# Patient Record
Sex: FEMALE | Race: WHITE | NOT HISPANIC OR LATINO
[De-identification: names, ages, dates, MRNs, and addresses within clinical notes are randomized per-mention and may not be internally consistent; named-entity substitution may affect disease eponyms.]

---

## 2017-11-18 ENCOUNTER — MESSAGE (OUTPATIENT)
Age: 47
End: 2017-11-18

## 2017-11-18 ENCOUNTER — LABORATORY RESULT (OUTPATIENT)
Age: 47
End: 2017-11-18

## 2017-11-18 ENCOUNTER — NON-APPOINTMENT (OUTPATIENT)
Age: 47
End: 2017-11-18

## 2017-11-18 ENCOUNTER — APPOINTMENT (OUTPATIENT)
Dept: INTERNAL MEDICINE | Facility: CLINIC | Age: 47
End: 2017-11-18
Payer: COMMERCIAL

## 2017-11-18 VITALS
HEART RATE: 76 BPM | RESPIRATION RATE: 16 BRPM | BODY MASS INDEX: 21.33 KG/M2 | TEMPERATURE: 98.3 F | DIASTOLIC BLOOD PRESSURE: 70 MMHG | WEIGHT: 128 LBS | HEIGHT: 65 IN | OXYGEN SATURATION: 98 % | SYSTOLIC BLOOD PRESSURE: 106 MMHG

## 2017-11-18 DIAGNOSIS — Z78.9 OTHER SPECIFIED HEALTH STATUS: ICD-10-CM

## 2017-11-18 DIAGNOSIS — Z83.3 FAMILY HISTORY OF DIABETES MELLITUS: ICD-10-CM

## 2017-11-18 DIAGNOSIS — Z82.0 FAMILY HISTORY OF EPILEPSY AND OTHER DISEASES OF THE NERVOUS SYSTEM: ICD-10-CM

## 2017-11-18 PROCEDURE — 99386 PREV VISIT NEW AGE 40-64: CPT

## 2017-11-18 RX ORDER — TOBRAMYCIN 3 MG/ML
0.3 SOLUTION/ DROPS OPHTHALMIC
Qty: 5 | Refills: 0 | Status: COMPLETED | COMMUNITY
Start: 2017-06-18

## 2017-11-24 ENCOUNTER — OTHER (OUTPATIENT)
Age: 47
End: 2017-11-24

## 2017-11-24 DIAGNOSIS — J45.909 UNSPECIFIED ASTHMA, UNCOMPLICATED: ICD-10-CM

## 2017-11-24 RX ORDER — LEVALBUTEROL TARTRATE 45 UG/1
45 AEROSOL, METERED ORAL
Qty: 1 | Refills: 5 | Status: DISCONTINUED | COMMUNITY
Start: 2017-11-18 | End: 2017-11-24

## 2017-11-28 LAB
25(OH)D3 SERPL-MCNC: 14.3 NG/ML
ALBUMIN SERPL ELPH-MCNC: 4.8 G/DL
ALP BLD-CCNC: 61 U/L
ALT SERPL-CCNC: 15 U/L
ANION GAP SERPL CALC-SCNC: 14 MMOL/L
APPEARANCE: ABNORMAL
AST SERPL-CCNC: 15 U/L
BASOPHILS # BLD AUTO: 0.04 K/UL
BASOPHILS NFR BLD AUTO: 0.7 %
BILIRUB SERPL-MCNC: 0.4 MG/DL
BILIRUBIN URINE: NEGATIVE
BLOOD URINE: ABNORMAL
BUN SERPL-MCNC: 14 MG/DL
CALCIUM SERPL-MCNC: 10.2 MG/DL
CHLORIDE SERPL-SCNC: 100 MMOL/L
CHOLEST SERPL-MCNC: 226 MG/DL
CHOLEST/HDLC SERPL: 3.1 RATIO
CO2 SERPL-SCNC: 26 MMOL/L
COLOR: YELLOW
CREAT SERPL-MCNC: 0.77 MG/DL
EOSINOPHIL # BLD AUTO: 0.14 K/UL
EOSINOPHIL NFR BLD AUTO: 2.6 %
GLUCOSE QUALITATIVE U: NEGATIVE MG/DL
GLUCOSE SERPL-MCNC: 87 MG/DL
HCT VFR BLD CALC: 39.4 %
HDLC SERPL-MCNC: 73 MG/DL
HGB BLD-MCNC: 12 G/DL
IMM GRANULOCYTES NFR BLD AUTO: 0 %
IRON SATN MFR SERPL: 8 %
IRON SERPL-MCNC: 31 UG/DL
KETONES URINE: NEGATIVE
LDLC SERPL CALC-MCNC: 135 MG/DL
LEUKOCYTE ESTERASE URINE: NEGATIVE
LYMPHOCYTES # BLD AUTO: 1.82 K/UL
LYMPHOCYTES NFR BLD AUTO: 33.6 %
MAN DIFF?: NORMAL
MCHC RBC-ENTMCNC: 24.9 PG
MCHC RBC-ENTMCNC: 30.5 GM/DL
MCV RBC AUTO: 81.9 FL
MONOCYTES # BLD AUTO: 0.31 K/UL
MONOCYTES NFR BLD AUTO: 5.7 %
NEUTROPHILS # BLD AUTO: 3.1 K/UL
NEUTROPHILS NFR BLD AUTO: 57.4 %
NITRITE URINE: NEGATIVE
PH URINE: 6.5
PLATELET # BLD AUTO: 342 K/UL
POTASSIUM SERPL-SCNC: 4.8 MMOL/L
PROT SERPL-MCNC: 8.1 G/DL
PROTEIN URINE: NEGATIVE MG/DL
RBC # BLD: 4.81 M/UL
RBC # FLD: 16.6 %
SODIUM SERPL-SCNC: 140 MMOL/L
SPECIFIC GRAVITY URINE: 1.02
T4 FREE SERPL-MCNC: 1.4 NG/DL
THYROGLOB AB SERPL-ACNC: <20 IU/ML
THYROPEROXIDASE AB SERPL IA-ACNC: <10 IU/ML
TIBC SERPL-MCNC: 404 UG/DL
TRIGL SERPL-MCNC: 89 MG/DL
TSH SERPL-ACNC: 0.41 UIU/ML
UIBC SERPL-MCNC: 373 UG/DL
UROBILINOGEN URINE: 1 MG/DL
VIT B12 SERPL-MCNC: 410 PG/ML
WBC # FLD AUTO: 5.41 K/UL

## 2018-06-14 ENCOUNTER — APPOINTMENT (OUTPATIENT)
Dept: INTERNAL MEDICINE | Facility: CLINIC | Age: 48
End: 2018-06-14
Payer: COMMERCIAL

## 2018-06-14 ENCOUNTER — NON-APPOINTMENT (OUTPATIENT)
Age: 48
End: 2018-06-14

## 2018-06-14 VITALS
SYSTOLIC BLOOD PRESSURE: 108 MMHG | OXYGEN SATURATION: 97 % | TEMPERATURE: 98.9 F | HEIGHT: 65 IN | BODY MASS INDEX: 20.66 KG/M2 | RESPIRATION RATE: 16 BRPM | DIASTOLIC BLOOD PRESSURE: 67 MMHG | WEIGHT: 124 LBS | HEART RATE: 77 BPM

## 2018-06-14 PROCEDURE — 99213 OFFICE O/P EST LOW 20 MIN: CPT

## 2018-06-14 RX ORDER — OSELTAMIVIR PHOSPHATE 75 MG/1
75 CAPSULE ORAL
Qty: 10 | Refills: 0 | Status: COMPLETED | COMMUNITY
Start: 2018-02-13

## 2018-06-26 ENCOUNTER — APPOINTMENT (OUTPATIENT)
Dept: ORTHOPEDIC SURGERY | Facility: CLINIC | Age: 48
End: 2018-06-26
Payer: COMMERCIAL

## 2018-06-26 VITALS
HEART RATE: 80 BPM | WEIGHT: 124 LBS | SYSTOLIC BLOOD PRESSURE: 109 MMHG | DIASTOLIC BLOOD PRESSURE: 73 MMHG | BODY MASS INDEX: 20.66 KG/M2 | HEIGHT: 65 IN

## 2018-06-26 DIAGNOSIS — M25.512 PAIN IN LEFT SHOULDER: ICD-10-CM

## 2018-06-26 DIAGNOSIS — Z82.62 FAMILY HISTORY OF OSTEOPOROSIS: ICD-10-CM

## 2018-06-26 DIAGNOSIS — Z78.9 OTHER SPECIFIED HEALTH STATUS: ICD-10-CM

## 2018-06-26 PROCEDURE — 99214 OFFICE O/P EST MOD 30 MIN: CPT | Mod: 25

## 2018-06-26 PROCEDURE — 20610 DRAIN/INJ JOINT/BURSA W/O US: CPT | Mod: LT

## 2018-08-12 DIAGNOSIS — F43.0 ACUTE STRESS REACTION: ICD-10-CM

## 2018-09-19 PROBLEM — F43.0 ACUTE REACTION TO SITUATIONAL STRESS: Status: ACTIVE | Noted: 2018-06-14

## 2018-11-30 ENCOUNTER — INPATIENT (INPATIENT)
Facility: HOSPITAL | Age: 48
LOS: 6 days | Discharge: HOME | End: 2018-12-07
Attending: NEUROLOGICAL SURGERY | Admitting: NEUROLOGICAL SURGERY
Payer: COMMERCIAL

## 2018-11-30 VITALS
OXYGEN SATURATION: 100 % | TEMPERATURE: 98 F | DIASTOLIC BLOOD PRESSURE: 47 MMHG | HEART RATE: 78 BPM | SYSTOLIC BLOOD PRESSURE: 113 MMHG | RESPIRATION RATE: 16 BRPM

## 2018-11-30 DIAGNOSIS — R90.0 INTRACRANIAL SPACE-OCCUPYING LESION FOUND ON DIAGNOSTIC IMAGING OF CENTRAL NERVOUS SYSTEM: ICD-10-CM

## 2018-11-30 LAB
APTT BLD: 28.4 SEC — SIGNIFICANT CHANGE UP (ref 27.5–36.3)
BASOPHILS # BLD AUTO: 0.03 K/UL — SIGNIFICANT CHANGE UP (ref 0–0.2)
BASOPHILS NFR BLD AUTO: 0.4 % — SIGNIFICANT CHANGE UP (ref 0–2)
BLD GP AB SCN SERPL QL: NEGATIVE — SIGNIFICANT CHANGE UP
EOSINOPHIL # BLD AUTO: 0.1 K/UL — SIGNIFICANT CHANGE UP (ref 0–0.5)
EOSINOPHIL NFR BLD AUTO: 1.5 % — SIGNIFICANT CHANGE UP (ref 0–6)
HCG SERPL-ACNC: < 5 MIU/ML — SIGNIFICANT CHANGE UP
HCT VFR BLD CALC: 35.5 % — SIGNIFICANT CHANGE UP (ref 34.5–45)
HGB BLD-MCNC: 10.6 G/DL — LOW (ref 11.5–15.5)
IMM GRANULOCYTES # BLD AUTO: 0.03 # — SIGNIFICANT CHANGE UP
IMM GRANULOCYTES NFR BLD AUTO: 0.4 % — SIGNIFICANT CHANGE UP (ref 0–1.5)
INR BLD: 0.92 — SIGNIFICANT CHANGE UP (ref 0.88–1.17)
LYMPHOCYTES # BLD AUTO: 1.71 K/UL — SIGNIFICANT CHANGE UP (ref 1–3.3)
LYMPHOCYTES # BLD AUTO: 24.9 % — SIGNIFICANT CHANGE UP (ref 13–44)
MCHC RBC-ENTMCNC: 23.7 PG — LOW (ref 27–34)
MCHC RBC-ENTMCNC: 29.9 % — LOW (ref 32–36)
MCV RBC AUTO: 79.4 FL — LOW (ref 80–100)
MONOCYTES # BLD AUTO: 0.56 K/UL — SIGNIFICANT CHANGE UP (ref 0–0.9)
MONOCYTES NFR BLD AUTO: 8.1 % — SIGNIFICANT CHANGE UP (ref 2–14)
NEUTROPHILS # BLD AUTO: 4.45 K/UL — SIGNIFICANT CHANGE UP (ref 1.8–7.4)
NEUTROPHILS NFR BLD AUTO: 64.7 % — SIGNIFICANT CHANGE UP (ref 43–77)
NRBC # FLD: 0 — SIGNIFICANT CHANGE UP
PLATELET # BLD AUTO: 273 K/UL — SIGNIFICANT CHANGE UP (ref 150–400)
PMV BLD: 10.2 FL — SIGNIFICANT CHANGE UP (ref 7–13)
PROTHROM AB SERPL-ACNC: 10.5 SEC — SIGNIFICANT CHANGE UP (ref 9.8–13.1)
RBC # BLD: 4.47 M/UL — SIGNIFICANT CHANGE UP (ref 3.8–5.2)
RBC # FLD: 16.3 % — HIGH (ref 10.3–14.5)
RH IG SCN BLD-IMP: POSITIVE — SIGNIFICANT CHANGE UP
WBC # BLD: 6.88 K/UL — SIGNIFICANT CHANGE UP (ref 3.8–10.5)
WBC # FLD AUTO: 6.88 K/UL — SIGNIFICANT CHANGE UP (ref 3.8–10.5)

## 2018-11-30 RX ORDER — INFLUENZA VIRUS VACCINE 15; 15; 15; 15 UG/.5ML; UG/.5ML; UG/.5ML; UG/.5ML
0.5 SUSPENSION INTRAMUSCULAR ONCE
Qty: 0 | Refills: 0 | Status: COMPLETED | OUTPATIENT
Start: 2018-11-30 | End: 2018-12-07

## 2018-11-30 RX ORDER — DEXAMETHASONE 0.5 MG/5ML
6 ELIXIR ORAL EVERY 6 HOURS
Qty: 0 | Refills: 0 | Status: DISCONTINUED | OUTPATIENT
Start: 2018-11-30 | End: 2018-12-03

## 2018-11-30 RX ORDER — ESCITALOPRAM OXALATE 10 MG/1
10 TABLET, FILM COATED ORAL AT BEDTIME
Qty: 0 | Refills: 0 | Status: DISCONTINUED | OUTPATIENT
Start: 2018-11-30 | End: 2018-12-03

## 2018-11-30 RX ORDER — DEXAMETHASONE 0.5 MG/5ML
4 ELIXIR ORAL ONCE
Qty: 0 | Refills: 0 | Status: COMPLETED | OUTPATIENT
Start: 2018-11-30 | End: 2018-11-30

## 2018-11-30 RX ORDER — ACETAMINOPHEN 500 MG
650 TABLET ORAL EVERY 6 HOURS
Qty: 0 | Refills: 0 | Status: DISCONTINUED | OUTPATIENT
Start: 2018-11-30 | End: 2018-12-03

## 2018-11-30 RX ORDER — ALPRAZOLAM 0.25 MG
0.5 TABLET ORAL ONCE
Qty: 0 | Refills: 0 | Status: DISCONTINUED | OUTPATIENT
Start: 2018-11-30 | End: 2018-12-01

## 2018-11-30 RX ORDER — LEVETIRACETAM 250 MG/1
1000 TABLET, FILM COATED ORAL ONCE
Qty: 0 | Refills: 0 | Status: DISCONTINUED | OUTPATIENT
Start: 2018-11-30 | End: 2018-11-30

## 2018-11-30 RX ORDER — LEVETIRACETAM 250 MG/1
500 TABLET, FILM COATED ORAL
Qty: 0 | Refills: 0 | Status: DISCONTINUED | OUTPATIENT
Start: 2018-11-30 | End: 2018-12-03

## 2018-11-30 RX ORDER — INSULIN LISPRO 100/ML
VIAL (ML) SUBCUTANEOUS
Qty: 0 | Refills: 0 | Status: DISCONTINUED | OUTPATIENT
Start: 2018-11-30 | End: 2018-12-03

## 2018-11-30 RX ORDER — PANTOPRAZOLE SODIUM 20 MG/1
40 TABLET, DELAYED RELEASE ORAL
Qty: 0 | Refills: 0 | Status: DISCONTINUED | OUTPATIENT
Start: 2018-11-30 | End: 2018-12-03

## 2018-11-30 RX ORDER — LEVETIRACETAM 250 MG/1
1000 TABLET, FILM COATED ORAL ONCE
Qty: 0 | Refills: 0 | Status: COMPLETED | OUTPATIENT
Start: 2018-11-30 | End: 2018-11-30

## 2018-11-30 RX ADMIN — Medication 4 MILLIGRAM(S): at 11:23

## 2018-11-30 RX ADMIN — Medication 6 MILLIGRAM(S): at 17:57

## 2018-11-30 RX ADMIN — ESCITALOPRAM OXALATE 10 MILLIGRAM(S): 10 TABLET, FILM COATED ORAL at 22:02

## 2018-11-30 RX ADMIN — LEVETIRACETAM 1000 MILLIGRAM(S): 250 TABLET, FILM COATED ORAL at 13:34

## 2018-11-30 RX ADMIN — Medication 650 MILLIGRAM(S): at 18:57

## 2018-11-30 RX ADMIN — Medication 650 MILLIGRAM(S): at 17:57

## 2018-11-30 RX ADMIN — LEVETIRACETAM 500 MILLIGRAM(S): 250 TABLET, FILM COATED ORAL at 17:57

## 2018-11-30 RX ADMIN — LEVETIRACETAM 400 MILLIGRAM(S): 250 TABLET, FILM COATED ORAL at 11:23

## 2018-11-30 NOTE — ED ADULT NURSE NOTE - OBJECTIVE STATEMENT
pt received to room 10 pt is A&0x3 pt comes to ED for HA x 2 months with vision changes. pt went to get a MRI. MRI reveled right pterional meningioma with mass effect and herniation and was told to come to the ER. pt complains of HA 6/10 pt offered pain meds pt denies pain meds at this time. pt VSS 20 g placed in L AC labs were drawn and sent EDMD at bedside for eval meds ordered and given as per EMAR. awaiting further orders Will continue to monitor the pt

## 2018-11-30 NOTE — H&P ADULT - ASSESSMENT
48 year old Female with 2 months of progressive blurry vision an double vision noted to have papilledema on exam referred for MRI brain which showed large 6cm right sided meningioma causing mass effect, cerebral edema and midline shift

## 2018-11-30 NOTE — ED PROVIDER NOTE - NEURO NEGATIVE STATEMENT, MLM
no loss of consciousness, no gait abnormality, + headache, no sensory deficits, and no weakness, +mass

## 2018-11-30 NOTE — H&P ADULT - NSHPLABSRESULTS_GEN_ALL_CORE
MRI (DIsk to be uploaded to PACS) 11/30 -6cm Dural based extraaxial mass arising from Right Pterion causing edema in frontal lobe. Midline shift. Tonsillar herniation.

## 2018-11-30 NOTE — H&P ADULT - NSHPSOCIALHISTORY_GEN_ALL_CORE
Has one 14 year old son, 2nd son  in September from complications from Leukemia  + ETOH socially  No smoking  No ellicit drug use

## 2018-11-30 NOTE — H&P ADULT - HISTORY OF PRESENT ILLNESS
49 yo F denies PMH sent to ER by Neurologist Dr. Yvrose Dias who obtained MRI brain and orbits after patient was evaluated by Opthalmology and Neuroopthalmology and noted to have papilledema. Patient has been suffering from mild headaches since September but attributed this to stress after recently losing her son from complications for bone marrow transplant from Leukemia. Visual changes began at the same time. Patient states her vision was blurry and intermittently seeing diplopia. Denies numbness, tingling, weakness, seizure like activity, Chest pain, SOB. Patient has an MRI brain in 2009 which was reported as normal.       PMD Dr. Bri Valadez

## 2018-11-30 NOTE — H&P ADULT - ATTENDING COMMENTS
giant tumor right ftp cw possible meningioma admit emergently herrnaiation on scan papilledema rb and pc of emergency craniotomy expl and aaccept I b n l t infection bleeding anesth pt neuro deficit seizures and death. will begin steroids anticonvuls and prepare for or with mra ct and cta

## 2018-11-30 NOTE — H&P ADULT - NSHPPHYSICALEXAM_GEN_ALL_CORE
Awake Alert Oriented x 3  Pupils 3 mm  b/l reactive to light  No nystagmus, No gaze palsy  ?Field cut in R outer quadrants  RUE 5/5  LUE 5/5  RLE5/5  LLE 5/5  Sensory intact to light touch

## 2018-11-30 NOTE — ED PROVIDER NOTE - CARE PLAN
Principal Discharge DX:	Meningioma Principal Discharge DX:	Intracranial mass  Secondary Diagnosis:	Visual field loss

## 2018-11-30 NOTE — ED PROVIDER NOTE - ATTENDING CONTRIBUTION TO CARE
Pema: 47 yo female with no significant medical history sent in for neurosurgical evaluation for a newly found brain mass on MRI. PT endorses two months of progressively worsening headaches- typically worse in the morning when she first wakes up and peripheral vision loss. Pt was seen by opthalmology, told that she elevated ocular pressures and was sent for an MRI. Pt had MRI done this morning and was called and sent ot the ED. MRI findings significant for 6cm meningioma causing herniation.  Pt currently c/o headache. Exam: GENERAL: well appearing, NAD, HEENT: MMM, PERRLA, CARDIO: +S1/S2, no murmurs, rubs or gallops, LUNGS: CTA B/L, no wheezing, rales or rhonchi, ABD: soft, nontender, BSx4 quadrants, no guarding or rigidity. EXT: No LE edema or calf TTP, 2+ distal pulses x 4 extremities. 5/5 strength x 4 extremities NEURO: AxOx3, + loss of inferior visual fields, otherwise cranial nerves intact no dysmetria or disdiadochokinesia. Negative Romberg's. SKIN: no rashes or lesions, well perfused A/P- 47 yo female with new meningioma cause visual field deficits and herniation on outpatient mRI. Neurosurgery consulted, recommending 4mg Iv decadron and keppra. will obtain preop labs and reassess.

## 2018-11-30 NOTE — ED PROVIDER NOTE - PHYSICAL EXAMINATION
NEURO: EOMi, PERRLA, visual fields intact, tongue midline, negative romberg, strength 5/5 UE and LE bilaterally, normal gait, facial expressions intact, point to point intact, rapid alternating movements intact, sensate intact to UE and LE bilaterally. NVI NEURO: EOMi, PERRLA, visual fields inferior not intact , tongue midline, negative romberg, strength 5/5 UE and LE bilaterally, normal gait, facial expressions intact, point to point intact, rapid alternating movements intact, sensate intact to UE and LE bilaterally. NVI

## 2018-11-30 NOTE — H&P ADULT - PROBLEM SELECTOR PLAN 1
1. Admit to neurosurgery  2. Neuro checks q 4 hours  3. Keppra 500mg BID for seizure prophylaxis  4. Dexamethasone 4mg q 6 hours (Will add GI prophylaxis)  5. Will need New MRI for operative planning for resection on Monday  6. Medical clearance/Preop labs ordred

## 2018-11-30 NOTE — ED ADULT TRIAGE NOTE - CHIEF COMPLAINT QUOTE
Pt sent to ED from outside MRI, pt has been having headaches for at least a month.  Rec'd call from neurosurg PA that pt has large meningioma with midline shift and signs of early herniation.  Pt denies PMH

## 2018-11-30 NOTE — ED PROVIDER NOTE - MEDICAL DECISION MAKING DETAILS
47 yo F here for known meningioma dx outpatient this morning via MRI. pt reports feeling well currently and is asymptomatic. referred to see Dr. Lul Etienne for Nsurg. PLAN: labs, pre ops, Nsurg cx

## 2018-11-30 NOTE — ED PROVIDER NOTE - PROGRESS NOTE DETAILS
ENEIDA Mendes: d/w nsurg and will see in ED, recc decadron and keppra ENEIDA Mendes: d/w nsurg and will see in ED, recc decadron 4mg and keppra ENEIDA akhtar: seen by Armando, admit to [facility] plan for OR

## 2018-12-01 DIAGNOSIS — D32.9 BENIGN NEOPLASM OF MENINGES, UNSPECIFIED: ICD-10-CM

## 2018-12-01 LAB
BUN SERPL-MCNC: 11 MG/DL — SIGNIFICANT CHANGE UP (ref 7–23)
CALCIUM SERPL-MCNC: 9.5 MG/DL — SIGNIFICANT CHANGE UP (ref 8.4–10.5)
CHLORIDE SERPL-SCNC: 99 MMOL/L — SIGNIFICANT CHANGE UP (ref 98–107)
CO2 SERPL-SCNC: 23 MMOL/L — SIGNIFICANT CHANGE UP (ref 22–31)
CREAT SERPL-MCNC: 0.57 MG/DL — SIGNIFICANT CHANGE UP (ref 0.5–1.3)
GLUCOSE BLDC GLUCOMTR-MCNC: 121 MG/DL — HIGH (ref 70–99)
GLUCOSE BLDC GLUCOMTR-MCNC: 125 MG/DL — HIGH (ref 70–99)
GLUCOSE BLDC GLUCOMTR-MCNC: 150 MG/DL — HIGH (ref 70–99)
GLUCOSE SERPL-MCNC: 123 MG/DL — HIGH (ref 70–99)
HBA1C BLD-MCNC: 5.3 % — SIGNIFICANT CHANGE UP (ref 4–5.6)
HCT VFR BLD CALC: 34.8 % — SIGNIFICANT CHANGE UP (ref 34.5–45)
HGB BLD-MCNC: 10.8 G/DL — LOW (ref 11.5–15.5)
MCHC RBC-ENTMCNC: 23.8 PG — LOW (ref 27–34)
MCHC RBC-ENTMCNC: 31 % — LOW (ref 32–36)
MCV RBC AUTO: 76.7 FL — LOW (ref 80–100)
NRBC # FLD: 0 — SIGNIFICANT CHANGE UP
PLATELET # BLD AUTO: 288 K/UL — SIGNIFICANT CHANGE UP (ref 150–400)
PMV BLD: 11.2 FL — SIGNIFICANT CHANGE UP (ref 7–13)
POTASSIUM SERPL-MCNC: 4.3 MMOL/L — SIGNIFICANT CHANGE UP (ref 3.5–5.3)
POTASSIUM SERPL-SCNC: 4.3 MMOL/L — SIGNIFICANT CHANGE UP (ref 3.5–5.3)
RBC # BLD: 4.54 M/UL — SIGNIFICANT CHANGE UP (ref 3.8–5.2)
RBC # FLD: 16.3 % — HIGH (ref 10.3–14.5)
SODIUM SERPL-SCNC: 135 MMOL/L — SIGNIFICANT CHANGE UP (ref 135–145)
WBC # BLD: 10.17 K/UL — SIGNIFICANT CHANGE UP (ref 3.8–10.5)
WBC # FLD AUTO: 10.17 K/UL — SIGNIFICANT CHANGE UP (ref 3.8–10.5)

## 2018-12-01 PROCEDURE — 70552 MRI BRAIN STEM W/DYE: CPT | Mod: 26

## 2018-12-01 RX ORDER — OXYCODONE HYDROCHLORIDE 5 MG/1
5 TABLET ORAL EVERY 4 HOURS
Qty: 0 | Refills: 0 | Status: DISCONTINUED | OUTPATIENT
Start: 2018-12-01 | End: 2018-12-03

## 2018-12-01 RX ADMIN — Medication 0.5 MILLIGRAM(S): at 02:21

## 2018-12-01 RX ADMIN — PANTOPRAZOLE SODIUM 40 MILLIGRAM(S): 20 TABLET, DELAYED RELEASE ORAL at 07:36

## 2018-12-01 RX ADMIN — Medication 6 MILLIGRAM(S): at 06:58

## 2018-12-01 RX ADMIN — Medication 650 MILLIGRAM(S): at 16:58

## 2018-12-01 RX ADMIN — LEVETIRACETAM 500 MILLIGRAM(S): 250 TABLET, FILM COATED ORAL at 06:58

## 2018-12-01 RX ADMIN — Medication 6 MILLIGRAM(S): at 13:22

## 2018-12-01 RX ADMIN — OXYCODONE HYDROCHLORIDE 5 MILLIGRAM(S): 5 TABLET ORAL at 19:10

## 2018-12-01 RX ADMIN — LEVETIRACETAM 500 MILLIGRAM(S): 250 TABLET, FILM COATED ORAL at 17:57

## 2018-12-01 RX ADMIN — Medication 650 MILLIGRAM(S): at 18:00

## 2018-12-01 RX ADMIN — Medication 6 MILLIGRAM(S): at 00:28

## 2018-12-01 RX ADMIN — Medication 6 MILLIGRAM(S): at 17:57

## 2018-12-01 RX ADMIN — ESCITALOPRAM OXALATE 10 MILLIGRAM(S): 10 TABLET, FILM COATED ORAL at 22:16

## 2018-12-01 RX ADMIN — OXYCODONE HYDROCHLORIDE 5 MILLIGRAM(S): 5 TABLET ORAL at 20:10

## 2018-12-01 NOTE — PROGRESS NOTE ADULT - SUBJECTIVE AND OBJECTIVE BOX
No issues overnight  Vital Signs Last 24 Hrs  T(C): 36.8 (01 Dec 2018 01:51), Max: 37.2 (30 Nov 2018 17:50)  T(F): 98.2 (01 Dec 2018 01:51), Max: 99 (30 Nov 2018 17:50)  HR: 77 (01 Dec 2018 01:51) (77 - 92)  BP: 103/60 (01 Dec 2018 01:51) (100/59 - 122/50)  BP(mean): --  RR: 16 (01 Dec 2018 01:51) (16 - 18)  SpO2: 96% (01 Dec 2018 01:51) (95% - 100%)    AAO x 3  PERRLA, EOMI  DELCID strength 5/5    MEDICATIONS  (STANDING):  dexamethasone     Tablet 6 milliGRAM(s) Oral every 6 hours  escitalopram 10 milliGRAM(s) Oral at bedtime  influenza   Vaccine 0.5 milliLiter(s) IntraMuscular once  insulin lispro (HumaLOG) corrective regimen sliding scale   SubCutaneous three times a day before meals  levETIRAcetam 500 milliGRAM(s) Oral two times a day  pantoprazole    Tablet 40 milliGRAM(s) Oral before breakfast    MEDICATIONS  (PRN):  acetaminophen   Tablet .. 650 milliGRAM(s) Oral every 6 hours PRN Mild Pain (1 - 3)                          10.6   6.88  )-----------( 273      ( 30 Nov 2018 11:17 )             35.5     11-30    139  |  101  |  14  ----------------------------<  90  4.2   |  26  |  0.61    Ca    9.2      30 Nov 2018 10:50    TPro  7.5  /  Alb  4.5  /  TBili  < 0.2<L>  /  DBili  x   /  AST  21  /  ALT  38<H>  /  AlkPhos  67  11-30

## 2018-12-02 ENCOUNTER — TRANSCRIPTION ENCOUNTER (OUTPATIENT)
Age: 48
End: 2018-12-02

## 2018-12-02 DIAGNOSIS — Z01.818 ENCOUNTER FOR OTHER PREPROCEDURAL EXAMINATION: ICD-10-CM

## 2018-12-02 DIAGNOSIS — J45.20 MILD INTERMITTENT ASTHMA, UNCOMPLICATED: ICD-10-CM

## 2018-12-02 LAB
APTT BLD: 23.2 SEC — LOW (ref 27.5–36.3)
BLD GP AB SCN SERPL QL: NEGATIVE — SIGNIFICANT CHANGE UP
BUN SERPL-MCNC: 17 MG/DL — SIGNIFICANT CHANGE UP (ref 7–23)
CALCIUM SERPL-MCNC: 9.4 MG/DL — SIGNIFICANT CHANGE UP (ref 8.4–10.5)
CHLORIDE SERPL-SCNC: 98 MMOL/L — SIGNIFICANT CHANGE UP (ref 98–107)
CO2 SERPL-SCNC: 22 MMOL/L — SIGNIFICANT CHANGE UP (ref 22–31)
CREAT SERPL-MCNC: 0.57 MG/DL — SIGNIFICANT CHANGE UP (ref 0.5–1.3)
GLUCOSE BLDC GLUCOMTR-MCNC: 117 MG/DL — HIGH (ref 70–99)
GLUCOSE BLDC GLUCOMTR-MCNC: 118 MG/DL — HIGH (ref 70–99)
GLUCOSE BLDC GLUCOMTR-MCNC: 124 MG/DL — HIGH (ref 70–99)
GLUCOSE SERPL-MCNC: 146 MG/DL — HIGH (ref 70–99)
HCG SERPL-ACNC: < 5 MIU/ML — SIGNIFICANT CHANGE UP
HCT VFR BLD CALC: 34.7 % — SIGNIFICANT CHANGE UP (ref 34.5–45)
HGB BLD-MCNC: 10.8 G/DL — LOW (ref 11.5–15.5)
INR BLD: 0.95 — SIGNIFICANT CHANGE UP (ref 0.88–1.17)
MCHC RBC-ENTMCNC: 23.6 PG — LOW (ref 27–34)
MCHC RBC-ENTMCNC: 31.1 % — LOW (ref 32–36)
MCV RBC AUTO: 75.8 FL — LOW (ref 80–100)
NRBC # FLD: 0 — SIGNIFICANT CHANGE UP
PLATELET # BLD AUTO: 284 K/UL — SIGNIFICANT CHANGE UP (ref 150–400)
PMV BLD: 10.9 FL — SIGNIFICANT CHANGE UP (ref 7–13)
POTASSIUM SERPL-MCNC: 4.2 MMOL/L — SIGNIFICANT CHANGE UP (ref 3.5–5.3)
POTASSIUM SERPL-SCNC: 4.2 MMOL/L — SIGNIFICANT CHANGE UP (ref 3.5–5.3)
PROTHROM AB SERPL-ACNC: 10.8 SEC — SIGNIFICANT CHANGE UP (ref 9.8–13.1)
RBC # BLD: 4.58 M/UL — SIGNIFICANT CHANGE UP (ref 3.8–5.2)
RBC # FLD: 16.5 % — HIGH (ref 10.3–14.5)
RH IG SCN BLD-IMP: POSITIVE — SIGNIFICANT CHANGE UP
SODIUM SERPL-SCNC: 137 MMOL/L — SIGNIFICANT CHANGE UP (ref 135–145)
T4 FREE SERPL-MCNC: 1.32 NG/DL — SIGNIFICANT CHANGE UP (ref 0.9–1.8)
TSH SERPL-MCNC: < 0.1 UIU/ML — LOW (ref 0.27–4.2)
WBC # BLD: 15.04 K/UL — HIGH (ref 3.8–10.5)
WBC # FLD AUTO: 15.04 K/UL — HIGH (ref 3.8–10.5)

## 2018-12-02 PROCEDURE — 77011 CT SCAN FOR LOCALIZATION: CPT | Mod: 26

## 2018-12-02 PROCEDURE — 99223 1ST HOSP IP/OBS HIGH 75: CPT

## 2018-12-02 PROCEDURE — 70496 CT ANGIOGRAPHY HEAD: CPT | Mod: 26

## 2018-12-02 RX ADMIN — Medication 6 MILLIGRAM(S): at 12:57

## 2018-12-02 RX ADMIN — PANTOPRAZOLE SODIUM 40 MILLIGRAM(S): 20 TABLET, DELAYED RELEASE ORAL at 06:09

## 2018-12-02 RX ADMIN — LEVETIRACETAM 500 MILLIGRAM(S): 250 TABLET, FILM COATED ORAL at 17:35

## 2018-12-02 RX ADMIN — Medication 6 MILLIGRAM(S): at 17:35

## 2018-12-02 RX ADMIN — ESCITALOPRAM OXALATE 10 MILLIGRAM(S): 10 TABLET, FILM COATED ORAL at 22:14

## 2018-12-02 RX ADMIN — OXYCODONE HYDROCHLORIDE 5 MILLIGRAM(S): 5 TABLET ORAL at 22:13

## 2018-12-02 RX ADMIN — Medication 6 MILLIGRAM(S): at 23:44

## 2018-12-02 RX ADMIN — Medication 6 MILLIGRAM(S): at 06:08

## 2018-12-02 RX ADMIN — OXYCODONE HYDROCHLORIDE 5 MILLIGRAM(S): 5 TABLET ORAL at 22:43

## 2018-12-02 RX ADMIN — LEVETIRACETAM 500 MILLIGRAM(S): 250 TABLET, FILM COATED ORAL at 06:08

## 2018-12-02 RX ADMIN — Medication 6 MILLIGRAM(S): at 00:11

## 2018-12-02 NOTE — PROGRESS NOTE ADULT - SUBJECTIVE AND OBJECTIVE BOX
No issues overnight  Vital Signs Last 24 Hrs  T(C): 36.8 (01 Dec 2018 21:17), Max: 37.1 (01 Dec 2018 14:52)  T(F): 98.3 (01 Dec 2018 21:17), Max: 98.8 (01 Dec 2018 14:52)  HR: 61 (01 Dec 2018 21:17) (61 - 78)  BP: 103/57 (01 Dec 2018 21:17) (100/50 - 108/62)  BP(mean): --  RR: 17 (01 Dec 2018 21:17) (16 - 18)  SpO2: 94% (01 Dec 2018 21:17) (94% - 100%)     AAO x 3  PERRLA, EOMI  DELCID strength 5/5    MEDICATIONS  (STANDING):  dexamethasone     Tablet 6 milliGRAM(s) Oral every 6 hours  escitalopram 10 milliGRAM(s) Oral at bedtime  influenza   Vaccine 0.5 milliLiter(s) IntraMuscular once  insulin lispro (HumaLOG) corrective regimen sliding scale   SubCutaneous three times a day before meals  levETIRAcetam 500 milliGRAM(s) Oral two times a day  pantoprazole    Tablet 40 milliGRAM(s) Oral before breakfast    MEDICATIONS  (PRN):  acetaminophen   Tablet .. 650 milliGRAM(s) Oral every 6 hours PRN Mild Pain (1 - 3)  oxyCODONE    IR 5 milliGRAM(s) Oral every 4 hours PRN Moderate Pain (4 - 6)    < from: MR Brain Stereotactic w/ IV Cont (12.01.18 @ 05:16) >  INTERPRETATION:  History: Meningioma. Preop MRI.    MRI of the brain was performed using sagittal T1, axial T 1 fast and echo   T2 weighted sequence of FLAIR diffusion and susceptibility weighted   sequence. The patient was injected with approximately 7 cc of Gadavist IV   with 0.5 cc of conscious discarded. Sagittal coronal and axial   T1-weighted sequences were performed. The patient was injected with   approximately 7 cc of Gadavist IV with 0.5 cc of contrast discarded.   Sagittal coronal and axial T1-weighted sequences were performed.    This exam is compared with prior contrast-enhanced orbital and brain MRI   performed on November 30, 2018.    Large homogeneous enhancing extra-axial lesion is again seen involving   the right frontal region. Associated dural tails are identified. This   lesion measures approximately 6.2 x 4.6 x 4.9 cm and is compatible with   underlying meningioma. Surrounding edema is identified. Extensive mass   effect on the right lateral ventricle seen with subfalcine and uncal   herniation identified. There is evidence of right to left shift (1.4 cm).   There is no evidence acute hemorrhage in posterior fossa or   supratentorial region.    Herniation of the cerebellar tonsils are identified. The tonsils extend   approximately 1.2 cm below the foramen.    Evaluation of the diffusion weighted sequence demonstrates no abnormal   areas of restricted diffusion to suggest acute infarct.    Evaluation of both distal internal carotid, anterior cerebral, left   middle cerebral, basilar and posterior cerebral arteries appear normal.   There is superior displacement of the right M1 and M2 branches secondary   to patient's known meningioma.    Impression: Large right frontal meningioma. Subfalcine and uncal   herniation is seen as well as right to left shift.    MRA demonstrates superior displacement of the right M1 and M2 segments by   the large meningiomain this area.    < end of copied text >

## 2018-12-02 NOTE — CONSULT NOTE ADULT - PROBLEM SELECTOR RECOMMENDATION 9
The patient does not have any shortness of breath with exertion, able to climb up multiple flight of stairs. She denies any chest pain, fevers, chills, nausea or vomiting. The patient also denies history of heart disease, DM, or HTN.    Patient is medically optimized for resection of meningioma. Given her reported history of a thyroid nodules that had spontaneously resolved and had caused hyperthyroidism, would suggest to obtain tsh and free t4.    Medicine will continue to follow

## 2018-12-02 NOTE — CONSULT NOTE ADULT - SUBJECTIVE AND OBJECTIVE BOX
HPI:  49 yo F denies PMH sent to ER by Neurologist Dr. Yvrose Dias who obtained MRI brain and orbits after patient was evaluated by Opthalmology and Neuroopthalmology and noted to have papilledema. Patient has been suffering from mild headaches since September but attributed this to stress after recently losing her son from complications for bone marrow transplant from Leukemia. Visual changes began at the same time. Patient states her vision was blurry and intermittently seeing diplopia. Denies numbness, tingling, weakness, seizure like activity, Chest pain, SOB. Patient has an MRI brain in  which was reported as normal.     PMD Dr. Bri Valadez (2018 12:50)    Patient to undergo neurosurgical resection of meningioma, medical Pre-Op clearance requested. As of note past medical history in this patient includes thyroid nodules around 9 years ago that spontaneously resolved and had caused hyperthyroidism and exercise induced asthma. The patient states that prior to her son BMT she used to run 5-10K and needed to use her rescue inhaler prior to the run, especially when it was cold. Currently, she does not use any rescue inhaler or medication. The patient does not have any shortness of breath with exertion, able to climb up multiple flight of stairs. She denies any chest pain, fevers, chills, nausea or vomiting. The patient also denies history of heart disease, DM, or HTN.       PAST MEDICAL & SURGICAL HISTORY:   delivery delivered      Review of Systems:   CONSTITUTIONAL: No fever, weight loss, or fatigue  EYES: +vision changes, increase in her prescription eye glasses  ENMT:  +Headaches and vertigo  NECK: No pain or stiffness  RESPIRATORY: No cough, wheezing, chills or hemoptysis; No shortness of breath  CARDIOVASCULAR: No chest pain, palpitations, dizziness, or leg swelling  GASTROINTESTINAL: No abdominal or epigastric pain. No nausea, vomiting, or hematemesis; No diarrhea or constipation. No melena or hematochezia.  GENITOURINARY: No dysuria, frequency, hematuria, or incontinence  SKIN: No itching, burning, rashes, or lesions   LYMPH NODES: No enlarged glands  MUSCULOSKELETAL: No joint pain or swelling; No muscle, back, or extremity pain  PSYCHIATRIC: No depression, anxiety, mood swings, or difficulty sleeping  HEME/LYMPH: No easy bruising, or bleeding gums  ALLERGY AND IMMUNOLOGIC: No hives or eczema    Allergies    amoxicillin (Hives)  NSAIDs (Hives)    Intolerances        Social History:   Denies alcohol abuse, states that she usually has two cups of wine a week. Denies drugs or smoking    FAMILY HISTORY:      MEDICATIONS  (STANDING):  dexamethasone     Tablet 6 milliGRAM(s) Oral every 6 hours  escitalopram 10 milliGRAM(s) Oral at bedtime  influenza   Vaccine 0.5 milliLiter(s) IntraMuscular once  insulin lispro (HumaLOG) corrective regimen sliding scale   SubCutaneous three times a day before meals  levETIRAcetam 500 milliGRAM(s) Oral two times a day  pantoprazole    Tablet 40 milliGRAM(s) Oral before breakfast    MEDICATIONS  (PRN):  acetaminophen   Tablet .. 650 milliGRAM(s) Oral every 6 hours PRN Mild Pain (1 - 3)  oxyCODONE    IR 5 milliGRAM(s) Oral every 4 hours PRN Moderate Pain (4 - 6)      T(C): 36.9 (18 @ 17:17), Max: 37.1 (18 @ 06:05)  HR: 67 (18 @ 17:17) (61 - 84)  BP: 103/48 (18 @ 17:17) (102/54 - 103/59)  RR: 18 (18 @ 17:17) (17 - 18)  SpO2: 100% (18 @ 17:17) (94% - 100%)    CAPILLARY BLOOD GLUCOSE      POCT Blood Glucose.: 118 mg/dL (02 Dec 2018 17:05)  POCT Blood Glucose.: 124 mg/dL (02 Dec 2018 12:55)  POCT Blood Glucose.: 117 mg/dL (02 Dec 2018 07:53)    I&O's Summary    01 Dec 2018 07:01  -  02 Dec 2018 07:00  --------------------------------------------------------  IN: 0 mL / OUT: 900 mL / NET: -900 mL    02 Dec 2018 07:01  -  02 Dec 2018 17:45  --------------------------------------------------------  IN: 0 mL / OUT: 0 mL / NET: 0 mL        PHYSICAL EXAM:  GENERAL: NAD, well-developed, very pleasant  HEAD:  Atraumatic, Normocephalic  EYES: EOMI, PERRLA, conjunctiva and sclera clear  NECK: Supple, No elevated JVD  CHEST/LUNG: Clear to auscultation bilaterally; No wheeze  HEART: Regular rate and rhythm; No murmurs, rubs, or gallops  ABDOMEN: Soft, Nontender, Nondistended; Bowel sounds present  EXTREMITIES:  2+ Peripheral Pulses, No clubbing, cyanosis, or edema  PSYCH: AAOx3  NEUROLOGY: CN II-XII grossly intact, moving all extremities  SKIN: No rashes or lesions    LABS:                        10.8   10.17 )-----------( 288      ( 01 Dec 2018 06:26 )             34.8     12-    135  |  99  |  11  ----------------------------<  123<H>  4.3   |  23  |  0.57    Ca    9.5      01 Dec 2018 06:26                  RADIOLOGY & ADDITIONAL TESTS:    ECG Personally Reviewed -     Imaging Personally Reviewed:    Consultant(s) Notes Reviewed:      Care Discussed with Consultants/Other Providers: Case discussed with neurosurgery HPI:  47 yo F denies PMH sent to ER by Neurologist Dr. Yvrose Dias who obtained MRI brain and orbits after patient was evaluated by Opthalmology and Neuroopthalmology and noted to have papilledema. Patient has been suffering from mild headaches since September but attributed this to stress after recently losing her son from complications for bone marrow transplant from Leukemia. Visual changes began at the same time. Patient states her vision was blurry and intermittently seeing diplopia. Denies numbness, tingling, weakness, seizure like activity, Chest pain, SOB. Patient has an MRI brain in  which was reported as normal.     PMD Dr. Bri Valadez (2018 12:50)    Patient to undergo neurosurgical resection of meningioma, medical Pre-Op clearance requested. As of note past medical history in this patient includes thyroid nodules around 9 years ago that spontaneously resolved and had caused hyperthyroidism and she also mentions that she has exercise induced asthma. The patient states that prior to her son's BMT she used to run 5-10K and needed to use her rescue inhaler prior to the run, especially when it was cold. Currently, she does not use a rescue inhaler and does not have any symptoms related to her asthma. She denies shortness of breath with exertion, she is able to climb up multiple flight of stairs. She denies any chest pain, fevers, chills, nausea or vomiting, there is also no mentioned history of heart disease, DM, or HTN.       PAST MEDICAL & SURGICAL HISTORY:   delivery delivered      Review of Systems:   CONSTITUTIONAL: No fever, weight loss, or fatigue  EYES: +vision changes, increase in her prescription eye glasses  ENMT:  +Headaches and vertigo  NECK: No pain or stiffness  RESPIRATORY: No cough, wheezing, chills or hemoptysis; No shortness of breath  CARDIOVASCULAR: No chest pain, palpitations, dizziness, or leg swelling  GASTROINTESTINAL: No abdominal or epigastric pain. No nausea, vomiting, or hematemesis; No diarrhea or constipation. No melena or hematochezia.  GENITOURINARY: No dysuria, frequency, hematuria, or incontinence  SKIN: No itching, burning, rashes, or lesions   LYMPH NODES: No enlarged glands  MUSCULOSKELETAL: No joint pain or swelling; No muscle, back, or extremity pain  PSYCHIATRIC: No depression, anxiety, mood swings, or difficulty sleeping  HEME/LYMPH: No easy bruising, or bleeding gums  ALLERGY AND IMMUNOLOGIC: No hives or eczema    Allergies    amoxicillin (Hives)  NSAIDs (Hives)    Intolerances        Social History:   Denies alcohol abuse, states that she usually has two cups of wine a week. Denies drugs or smoking    FAMILY HISTORY:      MEDICATIONS  (STANDING):  dexamethasone     Tablet 6 milliGRAM(s) Oral every 6 hours  escitalopram 10 milliGRAM(s) Oral at bedtime  influenza   Vaccine 0.5 milliLiter(s) IntraMuscular once  insulin lispro (HumaLOG) corrective regimen sliding scale   SubCutaneous three times a day before meals  levETIRAcetam 500 milliGRAM(s) Oral two times a day  pantoprazole    Tablet 40 milliGRAM(s) Oral before breakfast    MEDICATIONS  (PRN):  acetaminophen   Tablet .. 650 milliGRAM(s) Oral every 6 hours PRN Mild Pain (1 - 3)  oxyCODONE    IR 5 milliGRAM(s) Oral every 4 hours PRN Moderate Pain (4 - 6)      T(C): 36.9 (18 @ 17:17), Max: 37.1 (18 @ 06:05)  HR: 67 (18 @ 17:17) (61 - 84)  BP: 103/48 (18 @ 17:17) (102/54 - 103/59)  RR: 18 (18 @ 17:17) (17 - 18)  SpO2: 100% (18 @ 17:17) (94% - 100%)    CAPILLARY BLOOD GLUCOSE      POCT Blood Glucose.: 118 mg/dL (02 Dec 2018 17:05)  POCT Blood Glucose.: 124 mg/dL (02 Dec 2018 12:55)  POCT Blood Glucose.: 117 mg/dL (02 Dec 2018 07:53)    I&O's Summary    01 Dec 2018 07:01  -  02 Dec 2018 07:00  --------------------------------------------------------  IN: 0 mL / OUT: 900 mL / NET: -900 mL    02 Dec 2018 07:01  -  02 Dec 2018 17:45  --------------------------------------------------------  IN: 0 mL / OUT: 0 mL / NET: 0 mL        PHYSICAL EXAM:  GENERAL: NAD, well-developed, very pleasant  HEAD:  Atraumatic, Normocephalic  EYES: EOMI, PERRLA, conjunctiva and sclera clear  NECK: Supple, No elevated JVD  CHEST/LUNG: Clear to auscultation bilaterally; No wheeze  HEART: Regular rate and rhythm; No murmurs, rubs, or gallops  ABDOMEN: Soft, Nontender, Nondistended; Bowel sounds present  EXTREMITIES:  2+ Peripheral Pulses, No clubbing, cyanosis, or edema  PSYCH: AAOx3  NEUROLOGY: CN II-XII grossly intact, moving all extremities  SKIN: No rashes or lesions    LABS:                        10.8   10.17 )-----------( 288      ( 01 Dec 2018 06:26 )             34.8     12-    135  |  99  |  11  ----------------------------<  123<H>  4.3   |  23  |  0.57    Ca    9.5      01 Dec 2018 06:26                  RADIOLOGY & ADDITIONAL TESTS:    ECG Personally Reviewed -     Imaging Personally Reviewed:    Consultant(s) Notes Reviewed:      Care Discussed with Consultants/Other Providers: Case discussed with neurosurgery

## 2018-12-02 NOTE — CONSULT NOTE ADULT - PROBLEM SELECTOR RECOMMENDATION 3
hx of exercised induced asthma, not currently taking any medication and asymptomatic. No wheezing on auscultation.

## 2018-12-02 NOTE — CONSULT NOTE ADULT - ASSESSMENT
Gabriella Aguirre is a 48 year old lady with hx of exercise induced asthma, hyperthyroidism (as per patient the hyperthyroidism was result of thyroid nodules were not present on follow up) who was admitted to the neurosurgical team b/o meningioma. Medicine consulted for Pre-Op clearance.

## 2018-12-03 ENCOUNTER — RESULT REVIEW (OUTPATIENT)
Age: 48
End: 2018-12-03

## 2018-12-03 DIAGNOSIS — Z98.890 OTHER SPECIFIED POSTPROCEDURAL STATES: ICD-10-CM

## 2018-12-03 LAB
BASE EXCESS BLDA CALC-SCNC: -1.1 MMOL/L — SIGNIFICANT CHANGE UP
BASE EXCESS BLDA CALC-SCNC: -1.1 MMOL/L — SIGNIFICANT CHANGE UP
BASE EXCESS BLDA CALC-SCNC: -1.4 MMOL/L — SIGNIFICANT CHANGE UP
BUN SERPL-MCNC: 9 MG/DL — SIGNIFICANT CHANGE UP (ref 7–23)
CA-I BLDA-SCNC: 1.12 MMOL/L — LOW (ref 1.15–1.29)
CA-I BLDA-SCNC: 1.16 MMOL/L — SIGNIFICANT CHANGE UP (ref 1.15–1.29)
CA-I BLDA-SCNC: 1.28 MMOL/L — SIGNIFICANT CHANGE UP (ref 1.15–1.29)
CALCIUM SERPL-MCNC: 7.8 MG/DL — LOW (ref 8.4–10.5)
CHLORIDE SERPL-SCNC: 106 MMOL/L — SIGNIFICANT CHANGE UP (ref 98–107)
CO2 SERPL-SCNC: 21 MMOL/L — LOW (ref 22–31)
CREAT SERPL-MCNC: 0.56 MG/DL — SIGNIFICANT CHANGE UP (ref 0.5–1.3)
GLUCOSE BLDA-MCNC: 119 MG/DL — HIGH (ref 70–99)
GLUCOSE BLDA-MCNC: 126 MG/DL — HIGH (ref 70–99)
GLUCOSE BLDA-MCNC: 143 MG/DL — HIGH (ref 70–99)
GLUCOSE BLDC GLUCOMTR-MCNC: 106 MG/DL — HIGH (ref 70–99)
GLUCOSE BLDC GLUCOMTR-MCNC: 132 MG/DL — HIGH (ref 70–99)
GLUCOSE SERPL-MCNC: 144 MG/DL — HIGH (ref 70–99)
HCO3 BLDA-SCNC: 24 MMOL/L — SIGNIFICANT CHANGE UP (ref 22–26)
HCT VFR BLD CALC: 25 % — LOW (ref 34.5–45)
HCT VFR BLDA CALC: 29.4 % — LOW (ref 34.5–46.5)
HCT VFR BLDA CALC: 31.4 % — LOW (ref 34.5–46.5)
HCT VFR BLDA CALC: 33.7 % — LOW (ref 34.5–46.5)
HGB BLD-MCNC: 8.2 G/DL — LOW (ref 11.5–15.5)
HGB BLDA-MCNC: 10.2 G/DL — LOW (ref 11.5–15.5)
HGB BLDA-MCNC: 10.9 G/DL — LOW (ref 11.5–15.5)
HGB BLDA-MCNC: 9.5 G/DL — LOW (ref 11.5–15.5)
MAGNESIUM SERPL-MCNC: 1.4 MG/DL — LOW (ref 1.6–2.6)
MCHC RBC-ENTMCNC: 26.5 PG — LOW (ref 27–34)
MCHC RBC-ENTMCNC: 32.8 % — SIGNIFICANT CHANGE UP (ref 32–36)
MCV RBC AUTO: 80.9 FL — SIGNIFICANT CHANGE UP (ref 80–100)
NRBC # FLD: 0 — SIGNIFICANT CHANGE UP
PCO2 BLDA: 29 MMHG — LOW (ref 32–48)
PCO2 BLDA: 30 MMHG — LOW (ref 32–48)
PCO2 BLDA: 32 MMHG — SIGNIFICANT CHANGE UP (ref 32–48)
PH BLDA: 7.45 PH — SIGNIFICANT CHANGE UP (ref 7.35–7.45)
PH BLDA: 7.48 PH — HIGH (ref 7.35–7.45)
PH BLDA: 7.48 PH — HIGH (ref 7.35–7.45)
PHOSPHATE SERPL-MCNC: 5 MG/DL — HIGH (ref 2.5–4.5)
PLATELET # BLD AUTO: 127 K/UL — LOW (ref 150–400)
PMV BLD: 10.8 FL — SIGNIFICANT CHANGE UP (ref 7–13)
PO2 BLDA: 231 MMHG — HIGH (ref 83–108)
PO2 BLDA: 238 MMHG — HIGH (ref 83–108)
PO2 BLDA: 239 MMHG — HIGH (ref 83–108)
POTASSIUM BLDA-SCNC: 3.4 MMOL/L — SIGNIFICANT CHANGE UP (ref 3.4–4.5)
POTASSIUM BLDA-SCNC: 3.7 MMOL/L — SIGNIFICANT CHANGE UP (ref 3.4–4.5)
POTASSIUM BLDA-SCNC: 4.2 MMOL/L — SIGNIFICANT CHANGE UP (ref 3.4–4.5)
POTASSIUM SERPL-MCNC: 4.4 MMOL/L — SIGNIFICANT CHANGE UP (ref 3.5–5.3)
POTASSIUM SERPL-SCNC: 4.4 MMOL/L — SIGNIFICANT CHANGE UP (ref 3.5–5.3)
RBC # BLD: 3.09 M/UL — LOW (ref 3.8–5.2)
RBC # FLD: 15 % — HIGH (ref 10.3–14.5)
SAO2 % BLDA: 99.6 % — HIGH (ref 95–99)
SAO2 % BLDA: 99.7 % — HIGH (ref 95–99)
SAO2 % BLDA: 99.7 % — HIGH (ref 95–99)
SODIUM BLDA-SCNC: 133 MMOL/L — LOW (ref 136–146)
SODIUM BLDA-SCNC: 135 MMOL/L — LOW (ref 136–146)
SODIUM BLDA-SCNC: 135 MMOL/L — LOW (ref 136–146)
SODIUM SERPL-SCNC: 141 MMOL/L — SIGNIFICANT CHANGE UP (ref 135–145)
WBC # BLD: 21.68 K/UL — HIGH (ref 3.8–10.5)
WBC # FLD AUTO: 21.68 K/UL — HIGH (ref 3.8–10.5)

## 2018-12-03 PROCEDURE — 88331 PATH CONSLTJ SURG 1 BLK 1SPC: CPT | Mod: 26

## 2018-12-03 PROCEDURE — 88341 IMHCHEM/IMCYTCHM EA ADD ANTB: CPT | Mod: 26,59

## 2018-12-03 PROCEDURE — 88334 PATH CONSLTJ SURG CYTO XM EA: CPT | Mod: 26,59

## 2018-12-03 PROCEDURE — 88342 IMHCHEM/IMCYTCHM 1ST ANTB: CPT | Mod: 26,59

## 2018-12-03 PROCEDURE — 88360 TUMOR IMMUNOHISTOCHEM/MANUAL: CPT | Mod: 26

## 2018-12-03 PROCEDURE — 88307 TISSUE EXAM BY PATHOLOGIST: CPT | Mod: 26

## 2018-12-03 RX ORDER — OXYCODONE AND ACETAMINOPHEN 5; 325 MG/1; MG/1
1 TABLET ORAL EVERY 4 HOURS
Qty: 0 | Refills: 0 | Status: DISCONTINUED | OUTPATIENT
Start: 2018-12-03 | End: 2018-12-03

## 2018-12-03 RX ORDER — PANTOPRAZOLE SODIUM 20 MG/1
40 TABLET, DELAYED RELEASE ORAL
Qty: 0 | Refills: 0 | Status: DISCONTINUED | OUTPATIENT
Start: 2018-12-03 | End: 2018-12-07

## 2018-12-03 RX ORDER — OXYCODONE HYDROCHLORIDE 5 MG/1
10 TABLET ORAL EVERY 6 HOURS
Qty: 0 | Refills: 0 | Status: DISCONTINUED | OUTPATIENT
Start: 2018-12-03 | End: 2018-12-06

## 2018-12-03 RX ORDER — DEXTROSE MONOHYDRATE, SODIUM CHLORIDE, AND POTASSIUM CHLORIDE 50; .745; 4.5 G/1000ML; G/1000ML; G/1000ML
1000 INJECTION, SOLUTION INTRAVENOUS
Qty: 0 | Refills: 0 | Status: DISCONTINUED | OUTPATIENT
Start: 2018-12-03 | End: 2018-12-04

## 2018-12-03 RX ORDER — SODIUM CHLORIDE 9 MG/ML
1000 INJECTION, SOLUTION INTRAVENOUS ONCE
Qty: 0 | Refills: 0 | Status: COMPLETED | OUTPATIENT
Start: 2018-12-03 | End: 2018-12-03

## 2018-12-03 RX ORDER — LEVETIRACETAM 250 MG/1
500 TABLET, FILM COATED ORAL
Qty: 0 | Refills: 0 | Status: DISCONTINUED | OUTPATIENT
Start: 2018-12-03 | End: 2018-12-07

## 2018-12-03 RX ORDER — DEXAMETHASONE 0.5 MG/5ML
6 ELIXIR ORAL EVERY 6 HOURS
Qty: 0 | Refills: 0 | Status: DISCONTINUED | OUTPATIENT
Start: 2018-12-03 | End: 2018-12-05

## 2018-12-03 RX ORDER — OXYCODONE AND ACETAMINOPHEN 5; 325 MG/1; MG/1
2 TABLET ORAL EVERY 6 HOURS
Qty: 0 | Refills: 0 | Status: DISCONTINUED | OUTPATIENT
Start: 2018-12-03 | End: 2018-12-03

## 2018-12-03 RX ORDER — ALBUMIN HUMAN 25 %
250 VIAL (ML) INTRAVENOUS
Qty: 0 | Refills: 0 | Status: COMPLETED | OUTPATIENT
Start: 2018-12-03 | End: 2018-12-03

## 2018-12-03 RX ORDER — ACETAMINOPHEN 500 MG
650 TABLET ORAL EVERY 6 HOURS
Qty: 0 | Refills: 0 | Status: DISCONTINUED | OUTPATIENT
Start: 2018-12-03 | End: 2018-12-07

## 2018-12-03 RX ORDER — ONDANSETRON 8 MG/1
4 TABLET, FILM COATED ORAL ONCE
Qty: 0 | Refills: 0 | Status: COMPLETED | OUTPATIENT
Start: 2018-12-03 | End: 2018-12-03

## 2018-12-03 RX ORDER — HYDROMORPHONE HYDROCHLORIDE 2 MG/ML
0.4 INJECTION INTRAMUSCULAR; INTRAVENOUS; SUBCUTANEOUS
Qty: 0 | Refills: 0 | Status: DISCONTINUED | OUTPATIENT
Start: 2018-12-03 | End: 2018-12-03

## 2018-12-03 RX ORDER — FENTANYL CITRATE 50 UG/ML
50 INJECTION INTRAVENOUS
Qty: 0 | Refills: 0 | Status: DISCONTINUED | OUTPATIENT
Start: 2018-12-03 | End: 2018-12-03

## 2018-12-03 RX ORDER — CITALOPRAM 10 MG/1
1 TABLET, FILM COATED ORAL
Qty: 0 | Refills: 0 | COMMUNITY

## 2018-12-03 RX ORDER — OXYCODONE HYDROCHLORIDE 5 MG/1
5 TABLET ORAL EVERY 4 HOURS
Qty: 0 | Refills: 0 | Status: DISCONTINUED | OUTPATIENT
Start: 2018-12-03 | End: 2018-12-07

## 2018-12-03 RX ORDER — ESCITALOPRAM OXALATE 10 MG/1
10 TABLET, FILM COATED ORAL AT BEDTIME
Qty: 0 | Refills: 0 | Status: DISCONTINUED | OUTPATIENT
Start: 2018-12-03 | End: 2018-12-07

## 2018-12-03 RX ORDER — INSULIN LISPRO 100/ML
VIAL (ML) SUBCUTANEOUS
Qty: 0 | Refills: 0 | Status: DISCONTINUED | OUTPATIENT
Start: 2018-12-03 | End: 2018-12-06

## 2018-12-03 RX ADMIN — OXYCODONE HYDROCHLORIDE 5 MILLIGRAM(S): 5 TABLET ORAL at 23:19

## 2018-12-03 RX ADMIN — OXYCODONE HYDROCHLORIDE 5 MILLIGRAM(S): 5 TABLET ORAL at 22:00

## 2018-12-03 RX ADMIN — FENTANYL CITRATE 50 MICROGRAM(S): 50 INJECTION INTRAVENOUS at 19:51

## 2018-12-03 RX ADMIN — LEVETIRACETAM 500 MILLIGRAM(S): 250 TABLET, FILM COATED ORAL at 05:21

## 2018-12-03 RX ADMIN — LEVETIRACETAM 500 MILLIGRAM(S): 250 TABLET, FILM COATED ORAL at 22:00

## 2018-12-03 RX ADMIN — Medication 500 MILLILITER(S): at 20:15

## 2018-12-03 RX ADMIN — FENTANYL CITRATE 50 MICROGRAM(S): 50 INJECTION INTRAVENOUS at 20:15

## 2018-12-03 RX ADMIN — Medication 500 MILLILITER(S): at 19:48

## 2018-12-03 RX ADMIN — Medication 6 MILLIGRAM(S): at 05:21

## 2018-12-03 RX ADMIN — SODIUM CHLORIDE 2000 MILLILITER(S): 9 INJECTION, SOLUTION INTRAVENOUS at 19:42

## 2018-12-03 RX ADMIN — DEXTROSE MONOHYDRATE, SODIUM CHLORIDE, AND POTASSIUM CHLORIDE 75 MILLILITER(S): 50; .745; 4.5 INJECTION, SOLUTION INTRAVENOUS at 20:45

## 2018-12-03 NOTE — PROGRESS NOTE ADULT - PROBLEM SELECTOR PLAN 1
Continue neurologic checks and vital signs Q1H  Repeat Head CT in AM  Postop MRI in 24-48H  continue decadron  continue keppra

## 2018-12-03 NOTE — PROGRESS NOTE ADULT - SUBJECTIVE AND OBJECTIVE BOX
Neurosurgery postop  No complaints  ICU Vital Signs Last 24 Hrs  T(C): 36.3 (03 Dec 2018 21:00), Max: 37 (03 Dec 2018 04:02)  T(F): 97.3 (03 Dec 2018 21:00), Max: 98.6 (03 Dec 2018 04:02)  HR: 74 (03 Dec 2018 21:15) (63 - 92)  BP: 97/44 (03 Dec 2018 21:00) (82/59 - 108/60)  BP(mean): 56 (03 Dec 2018 21:00) (55 - 65)  ABP: 84/64 (03 Dec 2018 21:15) (78/45 - 95/53)  ABP(mean): 73 (03 Dec 2018 21:15) (53 - 91)  RR: 21 (03 Dec 2018 21:15) (12 - 21)  SpO2: 100% (03 Dec 2018 21:15) (99% - 100%)    AAO X 3  PERRLA, EOMI  CN 2-12 grossly intact  DELCID strength 5/5  No drift    Dressing C/D/I    POOL: 25cc    MEDICATIONS  (STANDING):  dexamethasone     Tablet 6 milliGRAM(s) Oral every 6 hours  escitalopram 10 milliGRAM(s) Oral at bedtime  influenza   Vaccine 0.5 milliLiter(s) IntraMuscular once  insulin lispro (HumaLOG) corrective regimen sliding scale   SubCutaneous three times a day before meals  levETIRAcetam 500 milliGRAM(s) Oral two times a day  ondansetron Injectable 4 milliGRAM(s) IV Push once  pantoprazole    Tablet 40 milliGRAM(s) Oral before breakfast  sodium chloride 0.9% with potassium chloride 20 mEq/L 1000 milliLiter(s) (75 mL/Hr) IV Continuous <Continuous>    MEDICATIONS  (PRN):  acetaminophen   Tablet .. 650 milliGRAM(s) Oral every 6 hours PRN Mild Pain (1 - 3)  oxyCODONE    IR 5 milliGRAM(s) Oral every 4 hours PRN Moderate Pain (4 - 6)  oxyCODONE    IR 10 milliGRAM(s) Oral every 6 hours PRN Severe Pain (7 - 10)                          8.2    21.68 )-----------( 127      ( 03 Dec 2018 20:20 )             25.0   12-02    137  |  98  |  17  ----------------------------<  146<H>  4.2   |  22  |  0.57    Ca    9.4      02 Dec 2018 19:11

## 2018-12-03 NOTE — PROGRESS NOTE ADULT - SUBJECTIVE AND OBJECTIVE BOX
No issues overnight  Vital Signs Last 24 Hrs  T(C): 36.6 (02 Dec 2018 22:04), Max: 37.1 (02 Dec 2018 06:05)  T(F): 97.8 (02 Dec 2018 22:04), Max: 98.7 (02 Dec 2018 06:05)  HR: 73 (02 Dec 2018 22:04) (67 - 84)  BP: 102/53 (02 Dec 2018 22:04) (102/53 - 103/59)  BP(mean): --  RR: 18 (02 Dec 2018 22:04) (18 - 18)  SpO2: 100% (02 Dec 2018 22:04) (99% - 100%)    AAO x 3  PERRLA, EOMI  DELCID strength 5/5    MEDICATIONS  (STANDING):  dexamethasone     Tablet 6 milliGRAM(s) Oral every 6 hours  escitalopram 10 milliGRAM(s) Oral at bedtime  influenza   Vaccine 0.5 milliLiter(s) IntraMuscular once  insulin lispro (HumaLOG) corrective regimen sliding scale   SubCutaneous three times a day before meals  levETIRAcetam 500 milliGRAM(s) Oral two times a day  pantoprazole    Tablet 40 milliGRAM(s) Oral before breakfast    MEDICATIONS  (PRN):  acetaminophen   Tablet .. 650 milliGRAM(s) Oral every 6 hours PRN Mild Pain (1 - 3)  oxyCODONE    IR 5 milliGRAM(s) Oral every 4 hours PRN Moderate Pain (4 - 6)                          10.8   15.04 )-----------( 284      ( 02 Dec 2018 19:11 )             34.7   12-02    137  |  98  |  17  ----------------------------<  146<H>  4.2   |  22  |  0.57    Ca    9.4      02 Dec 2018 19:11    PT/INR - ( 02 Dec 2018 19:11 )   PT: 10.8 SEC;   INR: 0.95          PTT - ( 02 Dec 2018 19:11 )  PTT:23.2 SEC    HCG Quantitative, Serum (12.02.18 @ 19:11)    HCG Quantitative, Serum: < 5: For pregnancy evaluation the reference values are as  follows:    Negative: <5 mIU/mL  Indeterminate: 5-25 mIU/mL (suggest repeat testing in 72hrs)  Positive: >25 mIU/mL    Note: hCG results of false positive and false negative for  pregnancy are rare, but can occur with this, and other hCG  tests. Purnima- and post-menopausal females may have mildly  elevated hCG concentrations, usually less than 14 mIU/mL,  that are constant over time.    Weeks of pregnancy:           mIU/mL  ------------------        -------  3                     6 -      71  4                    10 -     750  5                   220 -   7,100  6                   160 -  32,000  7                 3,700 - 164,000  8                32,000 - 150,000  9                64,000- 151,000  10                47,000 - 187,000  12                28,000 - 211,000  14                14,000 -  63,000  15                12,000 -  71,000  16 - 18            8,000 -  58,000 mIU/mL    Type + Screen (12.02.18 @ 17:59)    ABO Interpretation: O    Rh Interpretation: Positive    Antibody Screen: Negative    < from: CT Angio Head w/ IV Cont (12.02.18 @ 12:37) >  There is a large 6.0 x 4.6 cm mass within the right frontal region   abutting the right frontal calvarium and appears extra-axial. The mass   demonstrates diffuse homogenous contrast enhancement. There is local mass   effect with approximately 1.7 cm right to left midline shift. There is   mass effect on the right lateral ventricle. The ventricles are not   dilated.    No evidence of acute intracranial hemorrhage or acute territorial infarct.    The visualized paranasal sinuses demonstrate retention cyst or polyps   within the right mastoid sinus, there is lucency with extension to the   floors of the sinuses, underlying dental disease not excluded..    The mastoid air cells and middle ear cavities are unremarkable.    CT ANGIOGRAPHY BRAIN:  There is no evidence for significant stenosis, major vessel occlusion, or   aneurysm about the [place]. There is displacement of the right   M1 medially, by the right extra-axial presumed meningioma are multiple   prominent vessels noted within the mass which demonstrates increased   vascularity. There is an enlarged and prominent right middle meningeal   artery with asymmetric enlargement of the right foramen spinosum. The   enlarged right middle meningeal arterial vessel is noted with thinning of   the bony calvarial margin extending to the mass, axial series 3 image 87.    There is no evidence for significant stenosis, major vessel occlusion, or   aneurysm involving the vertebrobasilar system.    No enlarged vascular lesions or clusters of abnormal vessels are noted to   suggest an arterial venous malformation within the field-of-view.    Visualized portions of the superficial and deep venous systems are   unremarkable.      IMPRESSION:   Noncontrast and contrast-enhanced CT brain: Large enhancing mass within   the right frontal region compatible with a meningioma. There is local   mass effect and 1.7 cm right to left midline shift. No acute intracranial   hemorrhage or territorial infarct. The mass displaces the right middle   arteries medially which extend along the margins of the mass, there is   also an enlarged right middle meningeal artery which supplies the mass   with thinning of the adjacent bony margins.    CT angiography brain: No major vessel occlusion or proximal stenosis.    Normal anatomic variants as discussed in the body the report.      < end of copied text >

## 2018-12-03 NOTE — BRIEF OPERATIVE NOTE - PROCEDURE
<<-----Click on this checkbox to enter Procedure Craniotomy and excision of neoplasm of brain  12/03/2018    Active  ALECIAITT

## 2018-12-03 NOTE — PROGRESS NOTE ADULT - SUBJECTIVE AND OBJECTIVE BOX
SICU Consultation Note  =====================================================  HPI: 48 y.o. female denies PMH sent to ER by Neurologist Dr. Yvrose Dias who obtained MRI brain and orbits after patient was evaluated by Opthalmology and Neuro-ophthalmology and noted to have papilledema. Patient has been suffering from mild headaches since September but, attributed this to stress after recently losing her son from complications for bone marrow transplant from leukemia. Visual changes began at the same time. Patient states her vision was blurry and intermittently seeing diplopia. Denies numbness, tingling, weakness, seizure like activity, Chest pain, SOB. Patient has an MRI brain in  which was reported as normal.     Surgery Information  OR time:      EBL: 1L   Blood Products: 2u PRBC             PAST MEDICAL & SURGICAL HISTORY:   delivery delivered    Home Meds:   Allergies: amoxicillin (Hives)  NSAIDs (Hives)    Soc:   Advanced Directives: Presumed Full Code     ROS:    General: Non-Contributory  Skin/Breast: Non-Contributory  Ophthalmologic: Non-Contributory  ENMT: Non-Contributory  Respiratory and Thorax: Non-Contributory  Cardiovascular: Non-Contributory  Gastrointestinal: Non-Contributory  Genitourinary: Non-Contributory  Musculoskeletal: Non-Contributory  Neurological: Non-Contributory  Psychiatric: Non-Contributory  Hematology/Lymphatics: Non-Contributory  Endocrine: Non-Contributory  Allergic/Immunologic: Non-Contributory    CURRENT MEDICATIONS:   --------------------------------------------------------------------------------------  Neurologic Medications  acetaminophen   Tablet .. 650 milliGRAM(s) Oral every 6 hours PRN Mild Pain (1 - 3)  escitalopram 10 milliGRAM(s) Oral at bedtime  levETIRAcetam 500 milliGRAM(s) Oral two times a day  oxyCODONE    IR 5 milliGRAM(s) Oral every 4 hours PRN Moderate Pain (4 - 6)  oxyCODONE    IR 10 milliGRAM(s) Oral every 6 hours PRN Severe Pain (7 - 10)    Respiratory Medications    Cardiovascular Medications    Gastrointestinal Medications  pantoprazole    Tablet 40 milliGRAM(s) Oral before breakfast  sodium chloride 0.9% with potassium chloride 20 mEq/L 1000 milliLiter(s) IV Continuous <Continuous>    Genitourinary Medications    Hematologic/Oncologic Medications  influenza   Vaccine 0.5 milliLiter(s) IntraMuscular once    Antimicrobial/Immunologic Medications    Endocrine/Metabolic Medications  dexamethasone     Tablet 6 milliGRAM(s) Oral every 6 hours  insulin lispro (HumaLOG) corrective regimen sliding scale   SubCutaneous three times a day before meals    Topical/Other Medications    --------------------------------------------------------------------------------------    VITAL SIGNS, INS/OUTS (last 24 hours):  --------------------------------------------------------------------------------------  ((Insert SICU Vitals / Is+Os here)) ***  --------------------------------------------------------------------------------------    EXAM:  General/Neuro  RASS:   GCS:   Exam: Normal, NAD, alert, oriented x 3, no focal deficits. PERRLA  ***    Respiratory  Exam: Lungs clear to auscultation, Normal expansion/effort.  ***  [] Tracheostomy   [] Intubated  Mechanical Ventilation:     Cardiovascular  Exam: S1, S2.  Regular rate and rhythm.  Peripheral edema  ***  Cardiac Rhythm: Normal Sinus Rhythm  ECHO:     GI  Exam: Abdomen soft, Non-tender, Non-distended.  Gastrostomy / Jejunostomy tube in place.  Nasogastric tube in place.  Colostomy / Ileostomy.  ***  Wound:   ***  Current Diet:  NPO***      Tubes/Lines/Drains  ***  [x] Peripheral IV  [] Central Venous Line     	[] R	[] L	[] IJ	[] Fem	[] SC        Type:	    Date Placed:   [] Arterial Line		[] R	[] L	[] Fem	[] Rad	[] Ax	Date Placed:   [] PICC:         	[] Midline		[] Mediport           [] Urinary Catheter		Date Placed:     Extremities  Exam: Extremities warm, pink, well-perfused.        Derm:  Exam: Good skin turgor, no skin breakdown.      :   Exam: Toscano catheter in place.     LABS  --------------------------------------------------------------------------------------  ((Insert SICU Labs here))***  --------------------------------------------------------------------------------------    OTHER LABS    IMAGING RESULTS      ASSESSMENT:  48y Female ***    PLAN:   Neurologic:   Respiratory:   Cardiovascular:   Gastrointestinal/Nutrition:   Renal/Genitourinary:   Hematologic:   Infectious Disease:   Lines/Tubes:  Endocrine:   Disposition:     --------------------------------------------------------------------------------------    Critical Care Diagnoses: SICU Consultation Note  =====================================================  HPI: 48 y.o. female denies PMH sent to ER by Neurologist Dr. Yvrose Dias who obtained MRI brain and orbits after patient was evaluated by Opthalmology and Neuro-ophthalmology and noted to have papilledema. Patient has been suffering from mild headaches since September but, attributed this to stress after recently losing her son from complications for bone marrow transplant from leukemia. Visual changes began at the same time. Patient states her vision was blurry and intermittently seeing diplopia. Denies numbness, tingling, weakness, seizure like activity, Chest pain, SOB. Patient has an MRI brain in  which was reported as normal.     Surgery Information  OR time:      EBL: 1L   Blood Products: 2u PRBC             PAST MEDICAL & SURGICAL HISTORY:   delivery delivered    Home Meds:   Allergies: amoxicillin (Hives)  NSAIDs (Hives)    Soc:   Advanced Directives: Presumed Full Code     ROS:    Denies fever, chills, CP, SOB, abd pain, numbness, and tingling    CURRENT MEDICATIONS:   --------------------------------------------------------------------------------------  Neurologic Medications  acetaminophen   Tablet .. 650 milliGRAM(s) Oral every 6 hours PRN Mild Pain (1 - 3)  escitalopram 10 milliGRAM(s) Oral at bedtime  levETIRAcetam 500 milliGRAM(s) Oral two times a day  oxyCODONE    IR 5 milliGRAM(s) Oral every 4 hours PRN Moderate Pain (4 - 6)  oxyCODONE    IR 10 milliGRAM(s) Oral every 6 hours PRN Severe Pain (7 - 10)    Respiratory Medications    Cardiovascular Medications    Gastrointestinal Medications  pantoprazole    Tablet 40 milliGRAM(s) Oral before breakfast  sodium chloride 0.9% with potassium chloride 20 mEq/L 1000 milliLiter(s) IV Continuous <Continuous>    Genitourinary Medications    Hematologic/Oncologic Medications  influenza   Vaccine 0.5 milliLiter(s) IntraMuscular once    Antimicrobial/Immunologic Medications    Endocrine/Metabolic Medications  dexamethasone     Tablet 6 milliGRAM(s) Oral every 6 hours  insulin lispro (HumaLOG) corrective regimen sliding scale   SubCutaneous three times a day before meals    Topical/Other Medications    --------------------------------------------------------------------------------------  ICU Vital Signs Last 24 Hrs  T(C): 36.3 (03 Dec 2018 21:00), Max: 37 (03 Dec 2018 04:02)  T(F): 97.3 (03 Dec 2018 21:00), Max: 98.6 (03 Dec 2018 04:02)  HR: 74 (03 Dec 2018 21:15) (63 - 92)  BP: 97/44 (03 Dec 2018 21:00) (82/59 - 108/60)  BP(mean): 56 (03 Dec 2018 21:00) (55 - 65)  ABP: 84/64 (03 Dec 2018 21:15) (78/45 - 95/53)  ABP(mean): 73 (03 Dec 2018 21:15) (53 - 91)  RR: 21 (03 Dec 2018 21:15) (12 - 21)  SpO2: 100% (03 Dec 2018 21:15) (99% - 100%)    --------------------------------------------------------------------------------------  I&O's Detail    02 Dec 2018 07:01  -  03 Dec 2018 07:00  --------------------------------------------------------  IN:  Total IN: 0 mL    OUT:    Voided: 1550 mL  Total OUT: 1550 mL    Total NET: -1550 mL      03 Dec 2018 07:01  -  03 Dec 2018 22:03  --------------------------------------------------------  IN:    IV PiggyBack: 500 mL    Lactated Ringers IV Bolus: 1000 mL    sodium chloride 0.9% with potassium chloride 20 mEq/L: 150 mL  Total IN: 1650 mL    OUT:    Bulb: 75 mL    Indwelling Catheter - Urethral: 265 mL  Total OUT: 340 mL    Total NET: 1310 mL        --------------------------------------------------------------------------------------    EXAM:  General/Neuro  Exam: NAD, alert, oriented x 3, no focal deficits. FARHANA BOLIVAR  AASERGE X 3    Respiratory  Exam: Lungs clear to auscultation, Normal expansion/effort.     Cardiovascular  Exam: S1, S2.  Regular rate and rhythm.  Peripheral edema      GI  Exam: Abdomen soft, Non-tender, Non-distended  Current Diet:  NPO      Tubes/Lines/Drains   [x] Peripheral IV  [x Arterial Line		[x R	[] L	[] Fem	[] Rad	[] Ax	Date Placed: 12/3/18  [x} Urinary Catheter		Date Placed: 12/3/18    Extremities  Exam: Extremities warm, pink, well-perfused.      Derm:  Exam: Good skin turgor, no skin breakdown.      :   Exam: Rao catheter in place.     LABS  --------------------------------------------------------------------------------------  CBC ( @ 20:20)                              8.2<L>                         21.68<H>  )----------------(  127<L>     --    % Neutrophils, --    % Lymphocytes, ANC: --                                  25.0<L>  CBC ( 19:11)                              10.8<L>                         15.04<H>  )----------------(  284        --    % Neutrophils, --    % Lymphocytes, ANC: --                                  34.7      BMP ( @ 20:20)             141     |  106     |  9     		Ca++ --      Ca 7.8<L>             ---------------------------------( 144<H>		Mg 1.4<L>             4.4     |  21<L>   |  0.56  			Ph 5.0<H>  BMP ( 19:11)             137     |  98      |  17    		Ca++ --      Ca 9.4                ---------------------------------( 146<H>		Mg --                 4.2     |  22      |  0.57  			Ph --          Coags ( @ 19:11)  aPTT 23.2<L> / INR 0.95 / PT 10.8      ABG ( @ 16:41)     7.48<H> / 30<L> / 231<H> / 24 / -1.1 / 99.7<H>%     Lactate:    ABG ( @ 14:55)     7.48<H> / 29<L> / 238<H> / 24 / -1.4 / 99.7<H>%     Lactate:        --------------------------------------------------------------------------------------    IMAGING RESULTS  CT Head 18  Large enhancing mass within the   right frontal region compatible with a meningioma. There is local mass   effect and 1.7 cm right to left midline shift. No acute intracranial   hemorrhage or territorial infarct. The mass displaces the right middle   arteries medially which extend along the margins of the mass, there is also   an enlarged right middle meningeal artery which supplies the mass with   thinning of the adjacent bony margins.     CT angiography brain: No major vessel occlusion or proximal stenosis.   Normal anatomic variants as discussed in the body the report.       ASSESSMENT:  48 y.o. female s/p right craniotomy, resection fo meningioma    PLAN:   Pain management  - c/w tylenol, oxycodone PRN    Neurologic:   - c/w lexapro, decadron, keppra  - CT Head AM (noncontrast)    Respiratory:   - f/u blood gas AM  - f/u CXR AM    Cardiovascular:   - c/w IVF ( NS with 20mq K @ 75mL/hr)    Gastrointestinal/Nutrition:   - NPO  - c/w protonix  Renal/Genitourinary:  - rao  - strict Is and Os     Hematologic:  - f/u Am labs     Infectious Disease:   - No active Abx    Lines/Tubes:  - c/w right radial A-line  - monitor vital signs Q1     Endocrine:  - NIALL, FS     Disposition:   -Stable in PACU, admitted to SICU  -------------------------------------------------------------------------------------- SICU Consultation Note  =====================================================  HPI: 48 y.o. female denies PMH sent to ER by Neurologist Dr. Yvrose Dias who obtained MRI brain and orbits after patient was evaluated by Opthalmology and Neuro-ophthalmology and noted to have papilledema. Patient has been suffering from mild headaches since September but, attributed this to stress after recently losing her son from complications for bone marrow transplant from leukemia. Visual changes began at the same time. Patient states her vision was blurry and intermittently seeing diplopia. Denies numbness, tingling, weakness, seizure like activity, Chest pain, SOB. Patient has an MRI brain in  which was reported as normal.     Surgery Information  OR time:      EBL: 1L   Blood Products: 2u PRBC             PAST MEDICAL & SURGICAL HISTORY:   delivery delivered    Home Meds:   Allergies: amoxicillin (Hives)  NSAIDs (Hives)    Soc:   Advanced Directives: Presumed Full Code     ROS:    Denies fever, chills, CP, SOB, abd pain, numbness, and tingling    CURRENT MEDICATIONS:   --------------------------------------------------------------------------------------  Neurologic Medications  acetaminophen   Tablet .. 650 milliGRAM(s) Oral every 6 hours PRN Mild Pain (1 - 3)  escitalopram 10 milliGRAM(s) Oral at bedtime  levETIRAcetam 500 milliGRAM(s) Oral two times a day  oxyCODONE    IR 5 milliGRAM(s) Oral every 4 hours PRN Moderate Pain (4 - 6)  oxyCODONE    IR 10 milliGRAM(s) Oral every 6 hours PRN Severe Pain (7 - 10)    Respiratory Medications    Cardiovascular Medications    Gastrointestinal Medications  pantoprazole    Tablet 40 milliGRAM(s) Oral before breakfast  sodium chloride 0.9% with potassium chloride 20 mEq/L 1000 milliLiter(s) IV Continuous <Continuous>    Genitourinary Medications    Hematologic/Oncologic Medications  influenza   Vaccine 0.5 milliLiter(s) IntraMuscular once    Antimicrobial/Immunologic Medications    Endocrine/Metabolic Medications  dexamethasone     Tablet 6 milliGRAM(s) Oral every 6 hours  insulin lispro (HumaLOG) corrective regimen sliding scale   SubCutaneous three times a day before meals    Topical/Other Medications    --------------------------------------------------------------------------------------  ICU Vital Signs Last 24 Hrs  T(C): 36.3 (03 Dec 2018 21:00), Max: 37 (03 Dec 2018 04:02)  T(F): 97.3 (03 Dec 2018 21:00), Max: 98.6 (03 Dec 2018 04:02)  HR: 74 (03 Dec 2018 21:15) (63 - 92)  BP: 97/44 (03 Dec 2018 21:00) (82/59 - 108/60)  BP(mean): 56 (03 Dec 2018 21:00) (55 - 65)  ABP: 84/64 (03 Dec 2018 21:15) (78/45 - 95/53)  ABP(mean): 73 (03 Dec 2018 21:15) (53 - 91)  RR: 21 (03 Dec 2018 21:15) (12 - 21)  SpO2: 100% (03 Dec 2018 21:15) (99% - 100%)    --------------------------------------------------------------------------------------  I&O's Detail    02 Dec 2018 07:01  -  03 Dec 2018 07:00  --------------------------------------------------------  IN:  Total IN: 0 mL    OUT:    Voided: 1550 mL  Total OUT: 1550 mL    Total NET: -1550 mL      03 Dec 2018 07:01  -  03 Dec 2018 22:03  --------------------------------------------------------  IN:    IV PiggyBack: 500 mL    Lactated Ringers IV Bolus: 1000 mL    sodium chloride 0.9% with potassium chloride 20 mEq/L: 150 mL  Total IN: 1650 mL    OUT:    Bulb: 75 mL    Indwelling Catheter - Urethral: 265 mL  Total OUT: 340 mL    Total NET: 1310 mL        --------------------------------------------------------------------------------------    EXAM:  General/Neuro  Exam: NAD, alert, oriented x 3, no focal deficits. FARHANA BOLIVAR  AASERGE X 3    Respiratory  Exam: Lungs clear to auscultation, Normal expansion/effort.     Cardiovascular  Exam: S1, S2.  Regular rate and rhythm.  Peripheral edema      GI  Exam: Abdomen soft, Non-tender, Non-distended  Current Diet:  NPO      Tubes/Lines/Drains   [x] Peripheral IV  [x Arterial Line		[x R	[] L	[] Fem	[] Rad	[] Ax	Date Placed: 12/3/18  [x} Urinary Catheter		Date Placed: 12/3/18    Extremities  Exam: Extremities warm, pink, well-perfused.      Derm:  Exam: Good skin turgor, no skin breakdown.      :   Exam: Rao catheter in place.     LABS  --------------------------------------------------------------------------------------  CBC ( @ 20:20)                              8.2<L>                         21.68<H>  )----------------(  127<L>     --    % Neutrophils, --    % Lymphocytes, ANC: --                                  25.0<L>  CBC ( 19:11)                              10.8<L>                         15.04<H>  )----------------(  284        --    % Neutrophils, --    % Lymphocytes, ANC: --                                  34.7      BMP ( @ 20:20)             141     |  106     |  9     		Ca++ --      Ca 7.8<L>             ---------------------------------( 144<H>		Mg 1.4<L>             4.4     |  21<L>   |  0.56  			Ph 5.0<H>  BMP ( 19:11)             137     |  98      |  17    		Ca++ --      Ca 9.4                ---------------------------------( 146<H>		Mg --                 4.2     |  22      |  0.57  			Ph --          Coags ( @ 19:11)  aPTT 23.2<L> / INR 0.95 / PT 10.8      ABG ( @ 16:41)     7.48<H> / 30<L> / 231<H> / 24 / -1.1 / 99.7<H>%     Lactate:    ABG ( @ 14:55)     7.48<H> / 29<L> / 238<H> / 24 / -1.4 / 99.7<H>%     Lactate:        --------------------------------------------------------------------------------------    IMAGING RESULTS  CT Head 18  Large enhancing mass within the   right frontal region compatible with a meningioma. There is local mass   effect and 1.7 cm right to left midline shift. No acute intracranial   hemorrhage or territorial infarct. The mass displaces the right middle   arteries medially which extend along the margins of the mass, there is also   an enlarged right middle meningeal artery which supplies the mass with   thinning of the adjacent bony margins.     CT angiography brain: No major vessel occlusion or proximal stenosis.   Normal anatomic variants as discussed in the body the report.       ASSESSMENT:  48 y.o. female s/p right craniotomy, resection for meningioma    PLAN:   Pain management  - c/w tylenol, oxycodone PRN    Neurologic:   - c/w lexapro, decadron, keppra  - CT Head AM (noncontrast)    Respiratory:   - f/u blood gas AM  - f/u CXR AM    Cardiovascular:   - c/w IVF ( NS with 20mq K @ 75mL/hr)    Gastrointestinal/Nutrition:   - NPO  - c/w protonix  Renal/Genitourinary:  - rao  - strict Is and Os     Hematologic:  - f/u Am labs     Infectious Disease:   - No active Abx    Lines/Tubes:  - c/w right radial A-line  - monitor vital signs Q1     Endocrine:  - NIALL, FS     Disposition:   -Stable in PACU, admitted to SICU  --------------------------------------------------------------------------------------

## 2018-12-04 LAB
BASE EXCESS BLDA CALC-SCNC: 3.1 MMOL/L — SIGNIFICANT CHANGE UP
BASOPHILS # BLD AUTO: 0.01 K/UL — SIGNIFICANT CHANGE UP (ref 0–0.2)
BASOPHILS NFR BLD AUTO: 0.1 % — SIGNIFICANT CHANGE UP (ref 0–2)
BUN SERPL-MCNC: 10 MG/DL — SIGNIFICANT CHANGE UP (ref 7–23)
CA-I BLDA-SCNC: 1.15 MMOL/L — SIGNIFICANT CHANGE UP (ref 1.15–1.29)
CALCIUM SERPL-MCNC: 8.4 MG/DL — SIGNIFICANT CHANGE UP (ref 8.4–10.5)
CHLORIDE SERPL-SCNC: 102 MMOL/L — SIGNIFICANT CHANGE UP (ref 98–107)
CO2 SERPL-SCNC: 25 MMOL/L — SIGNIFICANT CHANGE UP (ref 22–31)
CREAT SERPL-MCNC: 0.56 MG/DL — SIGNIFICANT CHANGE UP (ref 0.5–1.3)
EOSINOPHIL # BLD AUTO: 0.01 K/UL — SIGNIFICANT CHANGE UP (ref 0–0.5)
EOSINOPHIL NFR BLD AUTO: 0.1 % — SIGNIFICANT CHANGE UP (ref 0–6)
GLUCOSE BLDA-MCNC: 115 MG/DL — HIGH (ref 70–99)
GLUCOSE BLDC GLUCOMTR-MCNC: 114 MG/DL — HIGH (ref 70–99)
GLUCOSE BLDC GLUCOMTR-MCNC: 123 MG/DL — HIGH (ref 70–99)
GLUCOSE BLDC GLUCOMTR-MCNC: 135 MG/DL — HIGH (ref 70–99)
GLUCOSE SERPL-MCNC: 110 MG/DL — HIGH (ref 70–99)
HCO3 BLDA-SCNC: 27 MMOL/L — HIGH (ref 22–26)
HCT VFR BLD CALC: 23.9 % — LOW (ref 34.5–45)
HCT VFR BLDA CALC: 25.4 % — LOW (ref 34.5–46.5)
HGB BLD-MCNC: 8.2 G/DL — LOW (ref 11.5–15.5)
HGB BLDA-MCNC: 8.2 G/DL — LOW (ref 11.5–15.5)
IMM GRANULOCYTES # BLD AUTO: 0.18 # — SIGNIFICANT CHANGE UP
IMM GRANULOCYTES NFR BLD AUTO: 0.9 % — SIGNIFICANT CHANGE UP (ref 0–1.5)
LACTATE BLDA-SCNC: 1.2 MMOL/L — SIGNIFICANT CHANGE UP (ref 0.5–2)
LYMPHOCYTES # BLD AUTO: 14.3 % — SIGNIFICANT CHANGE UP (ref 13–44)
LYMPHOCYTES # BLD AUTO: 2.72 K/UL — SIGNIFICANT CHANGE UP (ref 1–3.3)
MAGNESIUM SERPL-MCNC: 1.6 MG/DL — SIGNIFICANT CHANGE UP (ref 1.6–2.6)
MCHC RBC-ENTMCNC: 26.9 PG — LOW (ref 27–34)
MCHC RBC-ENTMCNC: 34.3 % — SIGNIFICANT CHANGE UP (ref 32–36)
MCV RBC AUTO: 78.4 FL — LOW (ref 80–100)
MONOCYTES # BLD AUTO: 2.09 K/UL — HIGH (ref 0–0.9)
MONOCYTES NFR BLD AUTO: 11 % — SIGNIFICANT CHANGE UP (ref 2–14)
NEUTROPHILS # BLD AUTO: 13.98 K/UL — HIGH (ref 1.8–7.4)
NEUTROPHILS NFR BLD AUTO: 73.6 % — SIGNIFICANT CHANGE UP (ref 43–77)
NRBC # FLD: 0 — SIGNIFICANT CHANGE UP
PCO2 BLDA: 35 MMHG — SIGNIFICANT CHANGE UP (ref 32–48)
PH BLDA: 7.49 PH — HIGH (ref 7.35–7.45)
PHOSPHATE SERPL-MCNC: 3.9 MG/DL — SIGNIFICANT CHANGE UP (ref 2.5–4.5)
PLATELET # BLD AUTO: 130 K/UL — LOW (ref 150–400)
PMV BLD: 11.4 FL — SIGNIFICANT CHANGE UP (ref 7–13)
PO2 BLDA: 108 MMHG — SIGNIFICANT CHANGE UP (ref 83–108)
POTASSIUM BLDA-SCNC: 4 MMOL/L — SIGNIFICANT CHANGE UP (ref 3.4–4.5)
POTASSIUM SERPL-MCNC: 4.1 MMOL/L — SIGNIFICANT CHANGE UP (ref 3.5–5.3)
POTASSIUM SERPL-SCNC: 4.1 MMOL/L — SIGNIFICANT CHANGE UP (ref 3.5–5.3)
RBC # BLD: 3.05 M/UL — LOW (ref 3.8–5.2)
RBC # FLD: 15.5 % — HIGH (ref 10.3–14.5)
SAO2 % BLDA: 98.7 % — SIGNIFICANT CHANGE UP (ref 95–99)
SODIUM BLDA-SCNC: 135 MMOL/L — LOW (ref 136–146)
SODIUM SERPL-SCNC: 137 MMOL/L — SIGNIFICANT CHANGE UP (ref 135–145)
WBC # BLD: 18.99 K/UL — HIGH (ref 3.8–10.5)
WBC # FLD AUTO: 18.99 K/UL — HIGH (ref 3.8–10.5)

## 2018-12-04 PROCEDURE — 71045 X-RAY EXAM CHEST 1 VIEW: CPT | Mod: 26

## 2018-12-04 PROCEDURE — 70450 CT HEAD/BRAIN W/O DYE: CPT | Mod: 26

## 2018-12-04 RX ORDER — ACETAMINOPHEN 500 MG
1000 TABLET ORAL ONCE
Qty: 0 | Refills: 0 | Status: COMPLETED | OUTPATIENT
Start: 2018-12-04 | End: 2018-12-04

## 2018-12-04 RX ADMIN — Medication 6 MILLIGRAM(S): at 19:31

## 2018-12-04 RX ADMIN — Medication 650 MILLIGRAM(S): at 17:00

## 2018-12-04 RX ADMIN — Medication 100 MILLIGRAM(S): at 22:30

## 2018-12-04 RX ADMIN — Medication 650 MILLIGRAM(S): at 16:15

## 2018-12-04 RX ADMIN — PANTOPRAZOLE SODIUM 40 MILLIGRAM(S): 20 TABLET, DELAYED RELEASE ORAL at 06:50

## 2018-12-04 RX ADMIN — Medication 650 MILLIGRAM(S): at 10:30

## 2018-12-04 RX ADMIN — Medication 650 MILLIGRAM(S): at 10:00

## 2018-12-04 RX ADMIN — Medication 400 MILLIGRAM(S): at 00:54

## 2018-12-04 RX ADMIN — LEVETIRACETAM 500 MILLIGRAM(S): 250 TABLET, FILM COATED ORAL at 06:49

## 2018-12-04 RX ADMIN — OXYCODONE HYDROCHLORIDE 10 MILLIGRAM(S): 5 TABLET ORAL at 22:35

## 2018-12-04 RX ADMIN — ESCITALOPRAM OXALATE 10 MILLIGRAM(S): 10 TABLET, FILM COATED ORAL at 22:30

## 2018-12-04 RX ADMIN — OXYCODONE HYDROCHLORIDE 5 MILLIGRAM(S): 5 TABLET ORAL at 02:46

## 2018-12-04 RX ADMIN — ESCITALOPRAM OXALATE 10 MILLIGRAM(S): 10 TABLET, FILM COATED ORAL at 00:34

## 2018-12-04 RX ADMIN — OXYCODONE HYDROCHLORIDE 5 MILLIGRAM(S): 5 TABLET ORAL at 02:16

## 2018-12-04 RX ADMIN — OXYCODONE HYDROCHLORIDE 10 MILLIGRAM(S): 5 TABLET ORAL at 22:55

## 2018-12-04 RX ADMIN — Medication 1000 MILLIGRAM(S): at 01:28

## 2018-12-04 RX ADMIN — LEVETIRACETAM 500 MILLIGRAM(S): 250 TABLET, FILM COATED ORAL at 19:30

## 2018-12-04 RX ADMIN — OXYCODONE HYDROCHLORIDE 5 MILLIGRAM(S): 5 TABLET ORAL at 07:00

## 2018-12-04 RX ADMIN — OXYCODONE HYDROCHLORIDE 5 MILLIGRAM(S): 5 TABLET ORAL at 08:00

## 2018-12-04 RX ADMIN — Medication 6 MILLIGRAM(S): at 06:49

## 2018-12-04 RX ADMIN — Medication 6 MILLIGRAM(S): at 11:18

## 2018-12-04 RX ADMIN — Medication 6 MILLIGRAM(S): at 00:34

## 2018-12-04 NOTE — PROGRESS NOTE ADULT - SUBJECTIVE AND OBJECTIVE BOX
ANESTHESIA POSTOP CHECK    48y Female POSTOP DAY 1     Patient remains in PACU overnight on SICU service.    No COMPLAINTS    NO APPARENT ANESTHESIA COMPLICATIONS

## 2018-12-04 NOTE — PROGRESS NOTE ADULT - SUBJECTIVE AND OBJECTIVE BOX
SICU Daily Progress Note  =====================================================  48 y.o. female denies PMH sent to ER by Neurologist Dr. Yvrose Dias who obtained MRI brain and orbits after patient was evaluated by Opthalmology and Neuro-ophthalmology and noted to have papilledema. Patient has been suffering from mild headaches since September but, attributed this to stress after recently losing her son from complications for bone marrow transplant from leukemia. Visual changes began at the same time. Patient states her vision was blurry and intermittently seeing diplopia. Denies numbness, tingling, weakness, seizure like activity, Chest pain, SOB. Patient has an MRI brain in 2009 which was reported as normal.     Allergies: amoxicillin (Hives)  NSAIDs (Hives)    MEDICATIONS:   --------------------------------------------------------------------------------------  Neurologic Medications  acetaminophen   Tablet .. 650 milliGRAM(s) Oral every 6 hours PRN Mild Pain (1 - 3)  escitalopram 10 milliGRAM(s) Oral at bedtime  levETIRAcetam 500 milliGRAM(s) Oral two times a day  oxyCODONE    IR 5 milliGRAM(s) Oral every 4 hours PRN Moderate Pain (4 - 6)  oxyCODONE    IR 10 milliGRAM(s) Oral every 6 hours PRN Severe Pain (7 - 10)    Respiratory Medications    Cardiovascular Medications    Gastrointestinal Medications  pantoprazole    Tablet 40 milliGRAM(s) Oral before breakfast  sodium chloride 0.9% with potassium chloride 20 mEq/L 1000 milliLiter(s) IV Continuous <Continuous>    Genitourinary Medications    Hematologic/Oncologic Medications  influenza   Vaccine 0.5 milliLiter(s) IntraMuscular once    Antimicrobial/Immunologic Medications    Endocrine/Metabolic Medications  dexamethasone     Tablet 6 milliGRAM(s) Oral every 6 hours  insulin lispro (HumaLOG) corrective regimen sliding scale   SubCutaneous three times a day before meals    Topical/Other Medications    --------------------------------------------------------------------------------------  ICU Vital Signs Last 24 Hrs  T(C): 36.9 (04 Dec 2018 01:00), Max: 37 (03 Dec 2018 04:02)  T(F): 98.4 (04 Dec 2018 01:00), Max: 98.6 (03 Dec 2018 04:02)  HR: 74 (04 Dec 2018 01:00) (63 - 92)  BP: 106/52 (04 Dec 2018 01:00) (81/49 - 108/60)  BP(mean): 65 (04 Dec 2018 01:00) (55 - 84)  ABP: 99/66 (03 Dec 2018 23:30) (78/45 - 99/66)  ABP(mean): 72 (03 Dec 2018 23:30) (53 - 91)  RR: 12 (04 Dec 2018 01:00) (12 - 21)  SpO2: 97% (04 Dec 2018 01:00) (97% - 100%)    --------------------------------------------------------------------------------------  I&O's Detail    02 Dec 2018 07:01  -  03 Dec 2018 07:00  --------------------------------------------------------  IN:  Total IN: 0 mL    OUT:    Voided: 1550 mL  Total OUT: 1550 mL    Total NET: -1550 mL      03 Dec 2018 07:01  -  04 Dec 2018 01:56  --------------------------------------------------------  IN:    IV PiggyBack: 500 mL    Lactated Ringers IV Bolus: 1000 mL    sodium chloride 0.9% with potassium chloride 20 mEq/L: 375 mL  Total IN: 1875 mL    OUT:    Bulb: 97 mL    Indwelling Catheter - Urethral: 685 mL  Total OUT: 782 mL    Total NET: 1093 mL      --------------------------------------------------------------------------------------    EXAM    General/Neuro  Exam: NAD, alert, oriented x 3, no focal deficits. FARHANA BOLIVAR  AASERGE X 3    Respiratory  Exam: Lungs clear to auscultation, Normal expansion/effort.     Cardiovascular  Exam: S1, S2.  Regular rate and rhythm.  Peripheral edema      GI  Exam: Abdomen soft, Non-tender, Non-distended  Current Diet:  NPO    Tubes/Lines/Drains   [x] Peripheral IV  [x Arterial Line		[x R	[] L	[] Fem	[] Rad	[] Ax	Date Placed: 12/3/18  [x} Urinary Catheter		Date Placed: 12/3/18    Extremities  Exam: Extremities warm, pink, well-perfused.      Derm:  Exam: Good skin turgor, no skin breakdown.      :   Exam: Rao catheter in place.       METABOLIC/FLUIDS/ELECTROLYTES  sodium chloride 0.9% with potassium chloride 20 mEq/L 1000 milliLiter(s) IV Continuous <Continuous>      HEMATOLOGIC  [x] VTE Prophylaxis:   Transfusions:	[] PRBC	[] Platelets		[] FFP	[] Cryoprecipitate    INFECTIOUS DISEASE  Antimicrobials/Immunologic Medications:  influenza   Vaccine 0.5 milliLiter(s) IntraMuscular once      LABS  --------------------------------------------------------------------------------------  CBC (12-03 @ 20:20)                              8.2<L>                         21.68<H>  )----------------(  127<L>     --    % Neutrophils, --    % Lymphocytes, ANC: --                                  25.0<L>  CBC (12-02 @ 19:11)                              10.8<L>                         15.04<H>  )----------------(  284        --    % Neutrophils, --    % Lymphocytes, ANC: --                                  34.7      BMP (12-03 @ 20:20)             141     |  106     |  9     		Ca++ --      Ca 7.8<L>             ---------------------------------( 144<H>		Mg 1.4<L>             4.4     |  21<L>   |  0.56  			Ph 5.0<H>  BMP (12-02 @ 19:11)             137     |  98      |  17    		Ca++ --      Ca 9.4                ---------------------------------( 146<H>		Mg --                 4.2     |  22      |  0.57  			Ph --          Coags (12-02 @ 19:11)  aPTT 23.2<L> / INR 0.95 / PT 10.8      ABG (12-03 @ 16:41)     7.48<H> / 30<L> / 231<H> / 24 / -1.1 / 99.7<H>%     Lactate:    ABG (12-03 @ 14:55)     7.48<H> / 29<L> / 238<H> / 24 / -1.4 / 99.7<H>%     Lactate:        --------------------------------------------------------------------------------------      ASSESSMENT:  48 y.o. female s/p right craniotomy, resection for meningioma    PLAN:   Pain management  - c/w tylenol, oxycodone PRN    Neurologic:   - c/w lexapro, decadron, keppra  - CT Head AM (noncontrast)    Respiratory:   - f/u blood gas AM  - f/u CXR AM    Cardiovascular:   - c/w IVF ( NS with 20mq K @ 75mL/hr)    Gastrointestinal/Nutrition:   - NPO  - c/w protonix  Renal/Genitourinary:  - rao  - strict Is and Os     Hematologic:  - f/u Am labs     Infectious Disease:   - No active Abx    Lines/Tubes:  - c/w right radial A-line  - monitor vital signs Q1     Endocrine:  - NIALL, FS     -------------------------------------------------------------------------------------- SICU Daily Progress Note  =====================================================  48 y.o. female denies PMH sent to ER by Neurologist Dr. Yvrose Dias who obtained MRI brain and orbits after patient was evaluated by Opthalmology and Neuro-ophthalmology and noted to have papilledema. Patient has been suffering from mild headaches since September but, attributed this to stress after recently losing her son from complications for bone marrow transplant from leukemia. Visual changes began at the same time. Patient states her vision was blurry and intermittently seeing diplopia. Denies numbness, tingling, weakness, seizure like activity, Chest pain, SOB. Patient has an MRI brain in 2009 which was reported as normal.     Allergies: amoxicillin (Hives)  NSAIDs (Hives)    MEDICATIONS:   --------------------------------------------------------------------------------------  Neurologic Medications  acetaminophen   Tablet .. 650 milliGRAM(s) Oral every 6 hours PRN Mild Pain (1 - 3)  escitalopram 10 milliGRAM(s) Oral at bedtime  levETIRAcetam 500 milliGRAM(s) Oral two times a day  oxyCODONE    IR 5 milliGRAM(s) Oral every 4 hours PRN Moderate Pain (4 - 6)  oxyCODONE    IR 10 milliGRAM(s) Oral every 6 hours PRN Severe Pain (7 - 10)    Respiratory Medications    Cardiovascular Medications    Gastrointestinal Medications  pantoprazole    Tablet 40 milliGRAM(s) Oral before breakfast  sodium chloride 0.9% with potassium chloride 20 mEq/L 1000 milliLiter(s) IV Continuous <Continuous>    Genitourinary Medications    Hematologic/Oncologic Medications  influenza   Vaccine 0.5 milliLiter(s) IntraMuscular once    Antimicrobial/Immunologic Medications    Endocrine/Metabolic Medications  dexamethasone     Tablet 6 milliGRAM(s) Oral every 6 hours  insulin lispro (HumaLOG) corrective regimen sliding scale   SubCutaneous three times a day before meals    Topical/Other Medications    --------------------------------------------------------------------------------------  ICU Vital Signs Last 24 Hrs  T(C): 36.9 (04 Dec 2018 01:00), Max: 37 (03 Dec 2018 04:02)  T(F): 98.4 (04 Dec 2018 01:00), Max: 98.6 (03 Dec 2018 04:02)  HR: 74 (04 Dec 2018 01:00) (63 - 92)  BP: 106/52 (04 Dec 2018 01:00) (81/49 - 108/60)  BP(mean): 65 (04 Dec 2018 01:00) (55 - 84)  ABP: 99/66 (03 Dec 2018 23:30) (78/45 - 99/66)  ABP(mean): 72 (03 Dec 2018 23:30) (53 - 91)  RR: 12 (04 Dec 2018 01:00) (12 - 21)  SpO2: 97% (04 Dec 2018 01:00) (97% - 100%)    --------------------------------------------------------------------------------------  I&O's Detail    02 Dec 2018 07:01  -  03 Dec 2018 07:00  --------------------------------------------------------  IN:  Total IN: 0 mL    OUT:    Voided: 1550 mL  Total OUT: 1550 mL    Total NET: -1550 mL      03 Dec 2018 07:01  -  04 Dec 2018 01:56  --------------------------------------------------------  IN:    IV PiggyBack: 500 mL    Lactated Ringers IV Bolus: 1000 mL    sodium chloride 0.9% with potassium chloride 20 mEq/L: 375 mL  Total IN: 1875 mL    OUT:    Bulb: 97 mL    Indwelling Catheter - Urethral: 685 mL  Total OUT: 782 mL    Total NET: 1093 mL      --------------------------------------------------------------------------------------    EXAM    General/Neuro  Exam: NAD, alert, oriented x 3, no focal deficits. FARHANA BOLIVAR  AASERGE X 3  R subdural drain in place    Respiratory  Exam: Lungs clear to auscultation, Normal expansion/effort.     Cardiovascular  Exam: S1, S2.  Regular rate and rhythm.  Peripheral edema      GI  Exam: Abdomen soft, Non-tender, Non-distended  Current Diet:  NPO    Tubes/Lines/Drains   [x] Peripheral IV  [ ] Arterial Line		[ ] R	[] L	[] Fem	[] Rad	[] Ax	Date Placed: 12/3/18  [ ] Toscano    Extremities  Exam: Extremities warm, pink, well-perfused.      Derm:  Exam: Good skin turgor, no skin breakdown.      :   Exam: Toscano catheter in place.       METABOLIC/FLUIDS/ELECTROLYTES  sodium chloride 0.9% with potassium chloride 20 mEq/L 1000 milliLiter(s) IV Continuous <Continuous>      HEMATOLOGIC  [x] VTE Prophylaxis:   Transfusions:	[] PRBC	[] Platelets		[] FFP	[] Cryoprecipitate    INFECTIOUS DISEASE  Antimicrobials/Immunologic Medications:  influenza   Vaccine 0.5 milliLiter(s) IntraMuscular once      LABS  --------------------------------------------------------------------------------------  CBC (12-03 @ 20:20)                              8.2<L>                         21.68<H>  )----------------(  127<L>     --    % Neutrophils, --    % Lymphocytes, ANC: --                                  25.0<L>  CBC (12-02 @ 19:11)                              10.8<L>                         15.04<H>  )----------------(  284        --    % Neutrophils, --    % Lymphocytes, ANC: --                                  34.7      BMP (12-03 @ 20:20)             141     |  106     |  9     		Ca++ --      Ca 7.8<L>             ---------------------------------( 144<H>		Mg 1.4<L>             4.4     |  21<L>   |  0.56  			Ph 5.0<H>  BMP (12-02 @ 19:11)             137     |  98      |  17    		Ca++ --      Ca 9.4                ---------------------------------( 146<H>		Mg --                 4.2     |  22      |  0.57  			Ph --          Coags (12-02 @ 19:11)  aPTT 23.2<L> / INR 0.95 / PT 10.8      ABG (12-03 @ 16:41)     7.48<H> / 30<L> / 231<H> / 24 / -1.1 / 99.7<H>%     Lactate:    ABG (12-03 @ 14:55)     7.48<H> / 29<L> / 238<H> / 24 / -1.4 / 99.7<H>%     Lactate:

## 2018-12-04 NOTE — PROGRESS NOTE ADULT - ATTENDING COMMENTS
awake alert sitting in bed following commands. ct satisfactory hct 24.9 observation continue blayne drain

## 2018-12-04 NOTE — PROGRESS NOTE ADULT - ASSESSMENT
ASSESSMENT:  48 y.o. female s/p right craniotomy, resection for meningioma    PLAN:   Pain management  - c/w tylenol, oxycodone PRN    Neurologic:   - c/w lexapro, decadron, keppra  - Pain: tylenol, oxycodone PRN  - cont SD drain, Abx while drain is in  - Q1hr neuro checks  - CT Head this AM -> f/u read  - MRI 24-48 hours postop (to be done tonight, early AM tomorrow)  - cont to appreciate neurosurgery rec's    Respiratory:   - satting well on RA    Cardiovascular:   - HR, BP stable    Gastrointestinal/Nutrition:   - Regular diet started today -> will dc IVF when tolerating  - c/w protonix    Renal/Genitourinary:  - monitor BUN/Cr  - dc Toscano     Hematologic:  - monitor H/H  - hold chemical DVT ppx until at least 24 hours postop, SCDs for now    Infectious Disease:   - Abx while SD drain is in    Lines/Tubes:  - PIV  - Subdural POOL drain  - A-line & Toscano dc'd    Endocrine:  - c/w decadron  - BOUBACAR TIERNEY

## 2018-12-04 NOTE — PROGRESS NOTE ADULT - SUBJECTIVE AND OBJECTIVE BOX
No issues overnight  ICU Vital Signs Last 24 Hrs  T(C): 36.9 (04 Dec 2018 01:00), Max: 37 (03 Dec 2018 10:48)  T(F): 98.4 (04 Dec 2018 01:00), Max: 98.6 (03 Dec 2018 10:48)  HR: 71 (04 Dec 2018 03:00) (63 - 92)  BP: 100/47 (04 Dec 2018 03:00) (81/49 - 108/60)  BP(mean): 60 (04 Dec 2018 03:00) (55 - 84)  ABP: 99/66 (03 Dec 2018 23:30) (78/45 - 99/66)  ABP(mean): 72 (03 Dec 2018 23:30) (53 - 91)  RR: 16 (04 Dec 2018 03:00) (12 - 21)  SpO2: 100% (04 Dec 2018 03:00) (97% - 100%)    AAO X 3  PERRLA, EOMI  CN 2-12 grossly intact  DELCID strength 5/5  No drift    POOL: 97cc    MEDICATIONS  (STANDING):  dexamethasone     Tablet 6 milliGRAM(s) Oral every 6 hours  escitalopram 10 milliGRAM(s) Oral at bedtime  influenza   Vaccine 0.5 milliLiter(s) IntraMuscular once  insulin lispro (HumaLOG) corrective regimen sliding scale   SubCutaneous three times a day before meals  levETIRAcetam 500 milliGRAM(s) Oral two times a day  pantoprazole    Tablet 40 milliGRAM(s) Oral before breakfast  sodium chloride 0.9% with potassium chloride 20 mEq/L 1000 milliLiter(s) (75 mL/Hr) IV Continuous <Continuous>    MEDICATIONS  (PRN):  acetaminophen   Tablet .. 650 milliGRAM(s) Oral every 6 hours PRN Mild Pain (1 - 3)  oxyCODONE    IR 5 milliGRAM(s) Oral every 4 hours PRN Moderate Pain (4 - 6)  oxyCODONE    IR 10 milliGRAM(s) Oral every 6 hours PRN Severe Pain (7 - 10)                          8.2    21.68 )-----------( 127      ( 03 Dec 2018 20:20 )             25.0   12-03    141  |  106  |  9   ----------------------------<  144<H>  4.4   |  21<L>  |  0.56    Ca    7.8<L>      03 Dec 2018 20:20  Phos  5.0     12-03  Mg     1.4     12-03

## 2018-12-05 LAB
BUN SERPL-MCNC: 8 MG/DL — SIGNIFICANT CHANGE UP (ref 7–23)
CALCIUM SERPL-MCNC: 9.1 MG/DL — SIGNIFICANT CHANGE UP (ref 8.4–10.5)
CHLORIDE SERPL-SCNC: 102 MMOL/L — SIGNIFICANT CHANGE UP (ref 98–107)
CO2 SERPL-SCNC: 27 MMOL/L — SIGNIFICANT CHANGE UP (ref 22–31)
CREAT SERPL-MCNC: 0.57 MG/DL — SIGNIFICANT CHANGE UP (ref 0.5–1.3)
GLUCOSE BLDC GLUCOMTR-MCNC: 126 MG/DL — HIGH (ref 70–99)
GLUCOSE BLDC GLUCOMTR-MCNC: 129 MG/DL — HIGH (ref 70–99)
GLUCOSE BLDC GLUCOMTR-MCNC: 136 MG/DL — HIGH (ref 70–99)
GLUCOSE BLDC GLUCOMTR-MCNC: 136 MG/DL — HIGH (ref 70–99)
GLUCOSE BLDC GLUCOMTR-MCNC: 144 MG/DL — HIGH (ref 70–99)
GLUCOSE SERPL-MCNC: 123 MG/DL — HIGH (ref 70–99)
HCT VFR BLD CALC: 25.9 % — LOW (ref 34.5–45)
HGB BLD-MCNC: 8.7 G/DL — LOW (ref 11.5–15.5)
MAGNESIUM SERPL-MCNC: 2 MG/DL — SIGNIFICANT CHANGE UP (ref 1.6–2.6)
MCHC RBC-ENTMCNC: 27.3 PG — SIGNIFICANT CHANGE UP (ref 27–34)
MCHC RBC-ENTMCNC: 33.6 % — SIGNIFICANT CHANGE UP (ref 32–36)
MCV RBC AUTO: 81.2 FL — SIGNIFICANT CHANGE UP (ref 80–100)
NRBC # FLD: 0 — SIGNIFICANT CHANGE UP
PHOSPHATE SERPL-MCNC: 3.7 MG/DL — SIGNIFICANT CHANGE UP (ref 2.5–4.5)
PLATELET # BLD AUTO: 156 K/UL — SIGNIFICANT CHANGE UP (ref 150–400)
PMV BLD: 11.5 FL — SIGNIFICANT CHANGE UP (ref 7–13)
POTASSIUM SERPL-MCNC: 4.1 MMOL/L — SIGNIFICANT CHANGE UP (ref 3.5–5.3)
POTASSIUM SERPL-SCNC: 4.1 MMOL/L — SIGNIFICANT CHANGE UP (ref 3.5–5.3)
RBC # BLD: 3.19 M/UL — LOW (ref 3.8–5.2)
RBC # FLD: 16 % — HIGH (ref 10.3–14.5)
SODIUM SERPL-SCNC: 139 MMOL/L — SIGNIFICANT CHANGE UP (ref 135–145)
WBC # BLD: 15.05 K/UL — HIGH (ref 3.8–10.5)
WBC # FLD AUTO: 15.05 K/UL — HIGH (ref 3.8–10.5)

## 2018-12-05 PROCEDURE — 70450 CT HEAD/BRAIN W/O DYE: CPT | Mod: 26

## 2018-12-05 RX ORDER — HEPARIN SODIUM 5000 [USP'U]/ML
5000 INJECTION INTRAVENOUS; SUBCUTANEOUS EVERY 8 HOURS
Qty: 0 | Refills: 0 | Status: DISCONTINUED | OUTPATIENT
Start: 2018-12-05 | End: 2018-12-05

## 2018-12-05 RX ORDER — DEXAMETHASONE 0.5 MG/5ML
4 ELIXIR ORAL EVERY 6 HOURS
Qty: 0 | Refills: 0 | Status: DISCONTINUED | OUTPATIENT
Start: 2018-12-05 | End: 2018-12-06

## 2018-12-05 RX ADMIN — PANTOPRAZOLE SODIUM 40 MILLIGRAM(S): 20 TABLET, DELAYED RELEASE ORAL at 06:06

## 2018-12-05 RX ADMIN — LEVETIRACETAM 500 MILLIGRAM(S): 250 TABLET, FILM COATED ORAL at 06:06

## 2018-12-05 RX ADMIN — Medication 4 MILLIGRAM(S): at 13:59

## 2018-12-05 RX ADMIN — Medication 6 MILLIGRAM(S): at 08:08

## 2018-12-05 RX ADMIN — Medication 650 MILLIGRAM(S): at 04:10

## 2018-12-05 RX ADMIN — Medication 650 MILLIGRAM(S): at 11:21

## 2018-12-05 RX ADMIN — LEVETIRACETAM 500 MILLIGRAM(S): 250 TABLET, FILM COATED ORAL at 17:34

## 2018-12-05 RX ADMIN — Medication 4 MILLIGRAM(S): at 20:52

## 2018-12-05 RX ADMIN — OXYCODONE HYDROCHLORIDE 10 MILLIGRAM(S): 5 TABLET ORAL at 20:52

## 2018-12-05 RX ADMIN — Medication 650 MILLIGRAM(S): at 11:55

## 2018-12-05 RX ADMIN — ESCITALOPRAM OXALATE 10 MILLIGRAM(S): 10 TABLET, FILM COATED ORAL at 21:02

## 2018-12-05 RX ADMIN — Medication 650 MILLIGRAM(S): at 04:30

## 2018-12-05 RX ADMIN — OXYCODONE HYDROCHLORIDE 10 MILLIGRAM(S): 5 TABLET ORAL at 21:33

## 2018-12-05 RX ADMIN — Medication 6 MILLIGRAM(S): at 02:25

## 2018-12-05 RX ADMIN — Medication 100 MILLIGRAM(S): at 06:00

## 2018-12-05 NOTE — PROGRESS NOTE ADULT - ASSESSMENT
48 y.o. female POD 2 s/p right craniotomy, resection for meningioma, recovering without issue    PLAN:   Pain management  - c/w tylenol, oxycodone PRN    Neurologic:   - c/w lexapro, decadron, keppra  - Pain: tylenol, oxycodone PRN  - cont SD drain, Abx while drain is in  - Q1hr neuro checks  - CT Head this AM 12/5 -> f/u official read  - MRI to be done today   - cont to appreciate neurosurgery rec's    Respiratory:   - satting well on RA    Cardiovascular:   - HR, BP stable    Gastrointestinal/Nutrition:   - Regular diet started today -> will dc IVF when tolerating  - c/w protonix    Renal/Genitourinary:  - monitor BUN/Cr  - dc Toscano     Hematologic:  - monitor H/H  - f/u with Neurosx for initiating chemical DVT ppx,  SCDs for now    Infectious Disease:   - Abx while SD drain is in    Lines/Tubes:  - PIV  - Subdural POOL drain    Endocrine:  - c/w decadron  - BOUBACAR TIERNEY 48 y.o. female POD 2 s/p right craniotomy, resection for meningioma, recovering without issue    PLAN:   Pain management  - c/w tylenol, oxycodone PRN    Neurologic:   - c/w lexapro, decadron, keppra  -Decrease decadron to 4mg q6h  - Pain: tylenol, oxycodone PRN  - cont SD drain  - Q1hr neuro checks  - CT Head this AM 12/5 -> f/u official read  - MRI to be done today   - cont to appreciate neurosurgery rec's    Respiratory:   - satting well on RA    Cardiovascular:   - HR, BP stable    Gastrointestinal/Nutrition:   - Regular diet   - c/w protonix    Renal/Genitourinary:  - monitor BUN/Cr       Hematologic:  - monitor H/H  - f/u with Neurosx for initiating chemical DVT ppx,  SCDs for now    Infectious Disease:   - s/p clindamycin    Lines/Tubes:  - PIV  - Subdural POOL drain    Endocrine:  - c/w decadron  - BOUBACAR TIERNEY

## 2018-12-05 NOTE — PROGRESS NOTE ADULT - ATTENDING COMMENTS
48 y.o. female POD 2 s/p right craniotomy, resection for meningioma, recovering without issue    PLAN:   Pain management  - c/w tylenol, oxycodone PRN    Neurologic:   - c/w lexapro, decadron, keppra  -Decrease decadron to 4mg q6h  - Pain: tylenol, oxycodone PRN  - cont SD drain  - Q1hr neuro checks  - CT Head this AM 12/5 -> f/u official read  - MRI to be done today   - cont to appreciate neurosurgery rec's    Respiratory:   - satting well on RA    Cardiovascular:   - HR, BP stable    Gastrointestinal/Nutrition:   - Regular diet   - c/w protonix    Renal/Genitourinary:  - monitor BUN/Cr       Hematologic:  - monitor H/H  - f/u with Neurosx for initiating chemical DVT ppx,  SCDs for now    Infectious Disease:   - s/p clindamycin    Lines/Tubes:  - PIV  - Subdural POOL drain    Endocrine:  - c/w decadron  - NIALL, FS  CCDx; respiratory abnormality, sp craniotomy.  The patient is a critical care patient with life threatening hemodynamic and metabolic instability in SICU.  I have personally interviewed when possible and examined the patient, reviewed data and laboratory tests/x-rays and all pertinent electronic images.  I was physically present for the key portions of the evaluation and management (E/M) service provided.   The SICU team has a constant risk benefit analyzes discussion with the primary team, all consultants, House Staff and PA's on all decisions.  These diagnoses are unrelated to the surgical procedure noted above.  I meet with family if needed to get further history, discuss the case and make care decisions for this patient who might not be able to participate.  Time involved in performance of separately billable procedures was not counted toward my critical care time. There is no overlap.  I spent 55-75 minutes of critical care time for the diagnoses, assessment, plan and interventions.

## 2018-12-05 NOTE — PROGRESS NOTE ADULT - SUBJECTIVE AND OBJECTIVE BOX
24 HOUR EVENTS: No acute events overnight. IV locked yesterday. Tolerating regular diet.     HISTORY  48 y.o. female denies PMH sent to ER by Neurologist Dr. Yvrose Dias who obtained MRI brain and orbits after patient was evaluated by Opthalmology and Neuro-ophthalmology and noted to have papilledema. Patient has been suffering from mild headaches since September but, attributed this to stress after recently losing her son from complications for bone marrow transplant from leukemia. Visual changes began at the same time. Patient states her vision was blurry and intermittently seeing diplopia. Denies numbness, tingling, weakness, seizure like activity, Chest pain, SOB. Patient has an MRI brain in 2009 which was reported as normal.     SUBJECTIVE/ROS:  [x ] A ten-point review of systems was otherwise negative except as noted.  [ ] Due to altered mental status/intubation, subjective information were not able to be obtained from the patient. History was obtained, to the extent possible, from review of the chart and collateral sources of information.    NEURO  Exam: awake, alert, oriented  Meds: acetaminophen   Tablet .. 650 milliGRAM(s) Oral every 6 hours PRN Mild Pain (1 - 3)  escitalopram 10 milliGRAM(s) Oral at bedtime  levETIRAcetam 500 milliGRAM(s) Oral two times a day  oxyCODONE    IR 5 milliGRAM(s) Oral every 4 hours PRN Moderate Pain (4 - 6)  oxyCODONE    IR 10 milliGRAM(s) Oral every 6 hours PRN Severe Pain (7 - 10)    [x] Adequacy of sedation and pain control has been assessed and adjusted    RESPIRATORY  RR: 15 (12-05-18 @ 07:00) (12 - 24)  SpO2: 100% (12-05-18 @ 07:00) (96% - 100%)  Exam: unlabored, clear to auscultation bilaterally  ABG - ( 04 Dec 2018 05:30 )  pH: 7.49  /  pCO2: 35    /  pO2: 108   / HCO3: 27    / Base Excess: 3.1   /  SaO2: 98.7    Lactate: 1.2        CARDIOVASCULAR  HR: 82 (12-05-18 @ 07:00) (73 - 95)  BP: 98/50 (12-05-18 @ 06:00) (98/50 - 138/68)  BP(mean): 61 (12-05-18 @ 06:00) (56 - 91)  ABP: 105/91 (12-04-18 @ 08:30) (105/91 - 105/91)  ABP(mean): 96 (12-04-18 @ 08:30) (96 - 96)        Exam: regular rate and rhythm  Cardiac Rhythm: sinus  Perfusion     [x]Adequate   [ ]Inadequate  Mentation   [x]Normal       [ ]Reduced  Extremities  [x]Warm         [ ]Cool  Volume Status [ ]Hypervolemic [x]Euvolemic [ ]Hypovolemic  Meds:     GI/NUTRITION  Exam: soft, nontender, nondistended  Diet: Regular   Meds: pantoprazole    Tablet 40 milliGRAM(s) Oral before breakfast      GENITOURINARY  I&O's Detail    12-04 @ 07:01  -  12-05 @ 07:00  --------------------------------------------------------  IN:    IV PiggyBack: 100 mL    Oral Fluid: 1270 mL    sodium chloride 0.9% with potassium chloride 20 mEq/L: 600 mL  Total IN: 1970 mL    OUT:    Bulb: 199 mL    Indwelling Catheter - Urethral: 650 mL    Voided: 1700 mL  Total OUT: 2549 mL    Total NET: -579 mL          12-05    139  |  102  |  8   ----------------------------<  123<H>  4.1   |  27  |  0.57    Ca    9.1      05 Dec 2018 05:10  Phos  3.7     12-05  Mg     2.0     12-05      [ ] Toscano catheter, indication: N/A  Meds:     HEMATOLOGIC  Meds:   [x] VTE Prophylaxis                        8.7    15.05 )-----------( 156      ( 05 Dec 2018 05:10 )             25.9       Transfusion     [0 ] PRBC   [0 ] Platelets   [0 ] FFP   [0 ] Cryoprecipitate    INFECTIOUS DISEASES  WBC Count: 15.05 K/uL (12-05 @ 05:10)    RECENT CULTURES:    Meds: influenza   Vaccine 0.5 milliLiter(s) IntraMuscular once      ENDOCRINE  CAPILLARY BLOOD GLUCOSE      POCT Blood Glucose.: 129 mg/dL (05 Dec 2018 06:19)  POCT Blood Glucose.: 135 mg/dL (04 Dec 2018 19:08)  POCT Blood Glucose.: 123 mg/dL (04 Dec 2018 11:16)    Meds: dexamethasone     Tablet 6 milliGRAM(s) Oral every 6 hours  insulin lispro (HumaLOG) corrective regimen sliding scale   SubCutaneous three times a day before meals      ACCESS DEVICES:  [x ] Peripheral IV  [ ] Central Venous Line	[ ] R	[ ] L	[ ] IJ	[ ] Fem	[ ] SC	Placed:   [ ] Arterial Line		[ ] R	[ ] L	[ ] Fem	[ ] Rad	[ ] Ax	Placed:   [ ] PICC:					[ ] Mediport  [ ] Urinary Catheter, Date Placed:   [x] Necessity of urinary, arterial, and venous catheters discussed    OTHER MEDICATIONS:      CODE STATUS: FULL      IMAGING: < from: CT Head No Cont (12.04.18 @ 06:34) >  Low-lying cerebellar tonsils are seen. The tonsils extend approximately   7.2 mm below the foramen. This could be secondary to Chiari I   malformation.    Postop changes compatible with a right frontal temporal craniotomy is   identified. Previously noted large enhancing extra axial lesion has been   removed. There is evidence of high attenuation identified in the postop   region which is compatible areas of hemorrhage and or postop material.   Surrounding edema is identified. There is extra-axial air and fluid   identified in the postop region. This finding measures approximately 1.6   cm widest diameter. Decrease mass effect is seen on the right lateral   ventricle though residual mass effect is still identified. Right to left   shift (7.3 mm) seen.    There is evidence of an extra-axial drainage catheter identified.    Evaluation of the osseous structures with the appropriate window aside   from postop changes appear unremarkable    The visualized paranasal sinuses mastoid and middle ear regions appear   clear    Extracalvarial soft tissue swelling staples are seen in the postop region.    Impression: New postop changes as described above.    Low-lying cerebellar tonsils.    < end of copied text > 24 HOUR EVENTS: No acute events overnight. IV locked yesterday. Tolerating regular diet.     HISTORY  48 y.o. female denies PMH sent to ER by Neurologist Dr. Yvrose Dias who obtained MRI brain and orbits after patient was evaluated by Opthalmology and Neuro-ophthalmology and noted to have papilledema. Patient has been suffering from mild headaches since September but, attributed this to stress after recently losing her son from complications for bone marrow transplant from leukemia. Visual changes began at the same time. Patient states her vision was blurry and intermittently seeing diplopia. Denies numbness, tingling, weakness, seizure like activity, Chest pain, SOB. Patient has an MRI brain in 2009 which was reported as normal.     SUBJECTIVE/ROS:  [x ] A ten-point review of systems was otherwise negative except as noted.  [ ] Due to altered mental status/intubation, subjective information were not able to be obtained from the patient. History was obtained, to the extent possible, from review of the chart and collateral sources of information.    NEURO  Exam: awake, alert, oriented  Meds: acetaminophen   Tablet .. 650 milliGRAM(s) Oral every 6 hours PRN Mild Pain (1 - 3)  escitalopram 10 milliGRAM(s) Oral at bedtime  levETIRAcetam 500 milliGRAM(s) Oral two times a day  oxyCODONE    IR 5 milliGRAM(s) Oral every 4 hours PRN Moderate Pain (4 - 6)  oxyCODONE    IR 10 milliGRAM(s) Oral every 6 hours PRN Severe Pain (7 - 10)    [x] Adequacy of sedation and pain control has been assessed and adjusted    RESPIRATORY  RR: 15 (12-05-18 @ 07:00) (12 - 24)  SpO2: 100% (12-05-18 @ 07:00) (96% - 100%)  Exam: unlabored, clear to auscultation bilaterally  ABG - ( 04 Dec 2018 05:30 )  pH: 7.49  /  pCO2: 35    /  pO2: 108   / HCO3: 27    / Base Excess: 3.1   /  SaO2: 98.7    Lactate: 1.2        CARDIOVASCULAR  HR: 82 (12-05-18 @ 07:00) (73 - 95)  BP: 98/50 (12-05-18 @ 06:00) (98/50 - 138/68)  BP(mean): 61 (12-05-18 @ 06:00) (56 - 91)  ABP: 105/91 (12-04-18 @ 08:30) (105/91 - 105/91)  ABP(mean): 96 (12-04-18 @ 08:30) (96 - 96)        Exam: normal rate and regular rhythm  Cardiac Rhythm: sinus  Perfusion     [x]Adequate   [ ]Inadequate  Mentation   [x]Normal       [ ]Reduced  Extremities  [x]Warm         [ ]Cool  Volume Status [ ]Hypervolemic [x]Euvolemic [ ]Hypovolemic  Meds:     GI/NUTRITION  Exam: soft, nontender, nondistended  Diet: Regular   Meds: pantoprazole    Tablet 40 milliGRAM(s) Oral before breakfast      GENITOURINARY  I&O's Detail    12-04 @ 07:01  -  12-05 @ 07:00  --------------------------------------------------------  IN:    IV PiggyBack: 100 mL    Oral Fluid: 1270 mL    sodium chloride 0.9% with potassium chloride 20 mEq/L: 600 mL  Total IN: 1970 mL    OUT:    Bulb: 199 mL    Indwelling Catheter - Urethral: 650 mL    Voided: 1700 mL  Total OUT: 2549 mL    Total NET: -579 mL          12-05    139  |  102  |  8   ----------------------------<  123<H>  4.1   |  27  |  0.57    Ca    9.1      05 Dec 2018 05:10  Phos  3.7     12-05  Mg     2.0     12-05      [ ] Toscano catheter, indication: N/A  Meds:     HEMATOLOGIC  Meds:   [x] VTE Prophylaxis                        8.7    15.05 )-----------( 156      ( 05 Dec 2018 05:10 )             25.9       Transfusion     [0 ] PRBC   [0 ] Platelets   [0 ] FFP   [0 ] Cryoprecipitate    INFECTIOUS DISEASES  WBC Count: 15.05 K/uL (12-05 @ 05:10)    RECENT CULTURES:    Meds: influenza   Vaccine 0.5 milliLiter(s) IntraMuscular once      ENDOCRINE  CAPILLARY BLOOD GLUCOSE      POCT Blood Glucose.: 129 mg/dL (05 Dec 2018 06:19)  POCT Blood Glucose.: 135 mg/dL (04 Dec 2018 19:08)  POCT Blood Glucose.: 123 mg/dL (04 Dec 2018 11:16)    Meds: dexamethasone     Tablet 6 milliGRAM(s) Oral every 6 hours  insulin lispro (HumaLOG) corrective regimen sliding scale   SubCutaneous three times a day before meals      ACCESS DEVICES:  [x ] Peripheral IV  [ ] Central Venous Line	[ ] R	[ ] L	[ ] IJ	[ ] Fem	[ ] SC	Placed:   [ ] Arterial Line		[ ] R	[ ] L	[ ] Fem	[ ] Rad	[ ] Ax	Placed:   [ ] PICC:					[ ] Mediport  [ ] Urinary Catheter, Date Placed:   [x] Necessity of urinary, arterial, and venous catheters discussed    OTHER MEDICATIONS:      CODE STATUS: FULL      IMAGING: < from: CT Head No Cont (12.04.18 @ 06:34) >  Low-lying cerebellar tonsils are seen. The tonsils extend approximately   7.2 mm below the foramen. This could be secondary to Chiari I   malformation.    Postop changes compatible with a right frontal temporal craniotomy is   identified. Previously noted large enhancing extra axial lesion has been   removed. There is evidence of high attenuation identified in the postop   region which is compatible areas of hemorrhage and or postop material.   Surrounding edema is identified. There is extra-axial air and fluid   identified in the postop region. This finding measures approximately 1.6   cm widest diameter. Decrease mass effect is seen on the right lateral   ventricle though residual mass effect is still identified. Right to left   shift (7.3 mm) seen.    There is evidence of an extra-axial drainage catheter identified.    Evaluation of the osseous structures with the appropriate window aside   from postop changes appear unremarkable    The visualized paranasal sinuses mastoid and middle ear regions appear   clear    Extracalvarial soft tissue swelling staples are seen in the postop region.    Impression: New postop changes as described above.    Low-lying cerebellar tonsils.    < end of copied text >

## 2018-12-05 NOTE — PROGRESS NOTE ADULT - PROBLEM SELECTOR PLAN 1
Continue neurologic checks and vital signs Q1H  Repeat Head CT in AM  Postop MRI today  continue decadron  continue keppra

## 2018-12-05 NOTE — PROGRESS NOTE ADULT - SUBJECTIVE AND OBJECTIVE BOX
No issues overnight  ICU Vital Signs Last 24 Hrs  T(C): 37 (04 Dec 2018 22:00), Max: 37 (04 Dec 2018 22:00)  T(F): 98.6 (04 Dec 2018 22:00), Max: 98.6 (04 Dec 2018 22:00)  HR: 83 (05 Dec 2018 01:00) (71 - 95)  BP: 109/46 (05 Dec 2018 01:00) (96/50 - 138/68)  BP(mean): 58 (05 Dec 2018 00:00) (56 - 91)  ABP: 105/91 (04 Dec 2018 08:30) (105/91 - 105/91)  ABP(mean): 96 (04 Dec 2018 08:30) (96 - 96)  RR: 16 (05 Dec 2018 01:00) (12 - 24)  SpO2: 99% (05 Dec 2018 01:00) (96% - 100%)    AAO X 3  PERRLA, EOMI  CN 2-12 grossly intact  DELCID strength 5/5  No drift    POOL: 89cc    MEDICATIONS  (STANDING):  clindamycin IVPB 900 milliGRAM(s) IV Intermittent every 8 hours  dexamethasone     Tablet 6 milliGRAM(s) Oral every 6 hours  escitalopram 10 milliGRAM(s) Oral at bedtime  influenza   Vaccine 0.5 milliLiter(s) IntraMuscular once  insulin lispro (HumaLOG) corrective regimen sliding scale   SubCutaneous three times a day before meals  levETIRAcetam 500 milliGRAM(s) Oral two times a day  pantoprazole    Tablet 40 milliGRAM(s) Oral before breakfast    MEDICATIONS  (PRN):  acetaminophen   Tablet .. 650 milliGRAM(s) Oral every 6 hours PRN Mild Pain (1 - 3)  oxyCODONE    IR 5 milliGRAM(s) Oral every 4 hours PRN Moderate Pain (4 - 6)  oxyCODONE    IR 10 milliGRAM(s) Oral every 6 hours PRN Severe Pain (7 - 10)                          8.2    18.99 )-----------( 130      ( 04 Dec 2018 05:30 )             23.9   12-04    137  |  102  |  10  ----------------------------<  110<H>  4.1   |  25  |  0.56    Ca    8.4      04 Dec 2018 05:30  Phos  3.9     12-04  Mg     1.6     12-04    < from: CT Head No Cont (12.04.18 @ 06:34) >  This exam is compared with prior contrast-enhanced head CT performed on   December 2, 2018.    Low-lying cerebellar tonsils are seen. The tonsils extend approximately   7.2 mm below the foramen. This could be secondary to Chiari I   malformation.    Postop changes compatible with a right frontal temporal craniotomy is   identified. Previously noted large enhancing extra axial lesion has been   removed. There is evidence of high attenuation identified in the postop   region which is compatible areas of hemorrhage and or postop material.   Surrounding edema is identified. There is extra-axial air and fluid   identified in the postop region. This finding measures approximately 1.6   cm widest diameter. Decrease mass effect is seen on the right lateral   ventricle though residual mass effect is still identified. Right to left   shift (7.3 mm) seen.    There is evidence of an extra-axial drainage catheter identified.    Evaluation of the osseous structures with the appropriate window aside   from postop changes appear unremarkable    The visualized paranasal sinuses mastoid and middle ear regions appear   clear    Extracalvarial soft tissue swelling staples are seen in the postop region.    Impression: New postop changes as described above.    Low-lying cerebellar tonsils.    < end of copied text >

## 2018-12-06 LAB
BUN SERPL-MCNC: 13 MG/DL — SIGNIFICANT CHANGE UP (ref 7–23)
CALCIUM SERPL-MCNC: 9.2 MG/DL — SIGNIFICANT CHANGE UP (ref 8.4–10.5)
CHLORIDE SERPL-SCNC: 98 MMOL/L — SIGNIFICANT CHANGE UP (ref 98–107)
CO2 SERPL-SCNC: 26 MMOL/L — SIGNIFICANT CHANGE UP (ref 22–31)
CREAT SERPL-MCNC: 0.53 MG/DL — SIGNIFICANT CHANGE UP (ref 0.5–1.3)
GLUCOSE BLDC GLUCOMTR-MCNC: 106 MG/DL — HIGH (ref 70–99)
GLUCOSE BLDC GLUCOMTR-MCNC: 127 MG/DL — HIGH (ref 70–99)
GLUCOSE BLDC GLUCOMTR-MCNC: 162 MG/DL — HIGH (ref 70–99)
GLUCOSE BLDC GLUCOMTR-MCNC: 182 MG/DL — HIGH (ref 70–99)
GLUCOSE BLDC GLUCOMTR-MCNC: 186 MG/DL — HIGH (ref 70–99)
GLUCOSE SERPL-MCNC: 126 MG/DL — HIGH (ref 70–99)
HCT VFR BLD CALC: 26.3 % — LOW (ref 34.5–45)
HGB BLD-MCNC: 8.5 G/DL — LOW (ref 11.5–15.5)
MCHC RBC-ENTMCNC: 26.7 PG — LOW (ref 27–34)
MCHC RBC-ENTMCNC: 32.3 % — SIGNIFICANT CHANGE UP (ref 32–36)
MCV RBC AUTO: 82.7 FL — SIGNIFICANT CHANGE UP (ref 80–100)
NRBC # FLD: 0 — SIGNIFICANT CHANGE UP
PLATELET # BLD AUTO: 167 K/UL — SIGNIFICANT CHANGE UP (ref 150–400)
PMV BLD: 11.9 FL — SIGNIFICANT CHANGE UP (ref 7–13)
POTASSIUM SERPL-MCNC: 4.3 MMOL/L — SIGNIFICANT CHANGE UP (ref 3.5–5.3)
POTASSIUM SERPL-SCNC: 4.3 MMOL/L — SIGNIFICANT CHANGE UP (ref 3.5–5.3)
RBC # BLD: 3.18 M/UL — LOW (ref 3.8–5.2)
RBC # FLD: 16.3 % — HIGH (ref 10.3–14.5)
SODIUM SERPL-SCNC: 137 MMOL/L — SIGNIFICANT CHANGE UP (ref 135–145)
WBC # BLD: 18.47 K/UL — HIGH (ref 3.8–10.5)
WBC # FLD AUTO: 18.47 K/UL — HIGH (ref 3.8–10.5)

## 2018-12-06 PROCEDURE — 70553 MRI BRAIN STEM W/O & W/DYE: CPT | Mod: 26

## 2018-12-06 RX ORDER — DEXAMETHASONE 0.5 MG/5ML
2 ELIXIR ORAL EVERY 8 HOURS
Qty: 0 | Refills: 0 | Status: DISCONTINUED | OUTPATIENT
Start: 2018-12-06 | End: 2018-12-07

## 2018-12-06 RX ORDER — ALPRAZOLAM 0.25 MG
0.5 TABLET ORAL DAILY
Qty: 0 | Refills: 0 | Status: DISCONTINUED | OUTPATIENT
Start: 2018-12-06 | End: 2018-12-07

## 2018-12-06 RX ORDER — INSULIN LISPRO 100/ML
VIAL (ML) SUBCUTANEOUS
Qty: 0 | Refills: 0 | Status: DISCONTINUED | OUTPATIENT
Start: 2018-12-06 | End: 2018-12-07

## 2018-12-06 RX ORDER — OXYCODONE HYDROCHLORIDE 5 MG/1
5 TABLET ORAL ONCE
Qty: 0 | Refills: 0 | Status: DISCONTINUED | OUTPATIENT
Start: 2018-12-06 | End: 2018-12-06

## 2018-12-06 RX ORDER — MAGNESIUM HYDROXIDE 400 MG/1
30 TABLET, CHEWABLE ORAL DAILY
Qty: 0 | Refills: 0 | Status: DISCONTINUED | OUTPATIENT
Start: 2018-12-06 | End: 2018-12-07

## 2018-12-06 RX ORDER — OXYCODONE HYDROCHLORIDE 5 MG/1
10 TABLET ORAL EVERY 4 HOURS
Qty: 0 | Refills: 0 | Status: DISCONTINUED | OUTPATIENT
Start: 2018-12-06 | End: 2018-12-07

## 2018-12-06 RX ADMIN — Medication 2 MILLIGRAM(S): at 21:34

## 2018-12-06 RX ADMIN — OXYCODONE HYDROCHLORIDE 5 MILLIGRAM(S): 5 TABLET ORAL at 05:09

## 2018-12-06 RX ADMIN — LEVETIRACETAM 500 MILLIGRAM(S): 250 TABLET, FILM COATED ORAL at 18:21

## 2018-12-06 RX ADMIN — PANTOPRAZOLE SODIUM 40 MILLIGRAM(S): 20 TABLET, DELAYED RELEASE ORAL at 05:09

## 2018-12-06 RX ADMIN — OXYCODONE HYDROCHLORIDE 10 MILLIGRAM(S): 5 TABLET ORAL at 12:59

## 2018-12-06 RX ADMIN — Medication 2 MILLIGRAM(S): at 14:00

## 2018-12-06 RX ADMIN — OXYCODONE HYDROCHLORIDE 5 MILLIGRAM(S): 5 TABLET ORAL at 02:58

## 2018-12-06 RX ADMIN — OXYCODONE HYDROCHLORIDE 5 MILLIGRAM(S): 5 TABLET ORAL at 05:39

## 2018-12-06 RX ADMIN — OXYCODONE HYDROCHLORIDE 5 MILLIGRAM(S): 5 TABLET ORAL at 17:06

## 2018-12-06 RX ADMIN — LEVETIRACETAM 500 MILLIGRAM(S): 250 TABLET, FILM COATED ORAL at 05:09

## 2018-12-06 RX ADMIN — OXYCODONE HYDROCHLORIDE 10 MILLIGRAM(S): 5 TABLET ORAL at 08:52

## 2018-12-06 RX ADMIN — Medication 0.5 MILLIGRAM(S): at 13:59

## 2018-12-06 RX ADMIN — Medication 4 MILLIGRAM(S): at 02:06

## 2018-12-06 RX ADMIN — Medication 2: at 14:00

## 2018-12-06 RX ADMIN — OXYCODONE HYDROCHLORIDE 10 MILLIGRAM(S): 5 TABLET ORAL at 21:34

## 2018-12-06 RX ADMIN — OXYCODONE HYDROCHLORIDE 10 MILLIGRAM(S): 5 TABLET ORAL at 22:34

## 2018-12-06 RX ADMIN — OXYCODONE HYDROCHLORIDE 10 MILLIGRAM(S): 5 TABLET ORAL at 13:59

## 2018-12-06 RX ADMIN — ESCITALOPRAM OXALATE 10 MILLIGRAM(S): 10 TABLET, FILM COATED ORAL at 22:04

## 2018-12-06 RX ADMIN — MAGNESIUM HYDROXIDE 30 MILLILITER(S): 400 TABLET, CHEWABLE ORAL at 04:40

## 2018-12-06 RX ADMIN — OXYCODONE HYDROCHLORIDE 5 MILLIGRAM(S): 5 TABLET ORAL at 02:06

## 2018-12-06 RX ADMIN — OXYCODONE HYDROCHLORIDE 10 MILLIGRAM(S): 5 TABLET ORAL at 07:52

## 2018-12-06 RX ADMIN — Medication 4 MILLIGRAM(S): at 07:52

## 2018-12-06 RX ADMIN — OXYCODONE HYDROCHLORIDE 5 MILLIGRAM(S): 5 TABLET ORAL at 18:00

## 2018-12-06 NOTE — PHYSICAL THERAPY INITIAL EVALUATION ADULT - ADDITIONAL COMMENTS
Pt. left in bed post-PT in NAD with all lines/tubes intact & table/TV/phone/call bell within reach.  Friend at bedside.

## 2018-12-06 NOTE — PHYSICAL THERAPY INITIAL EVALUATION ADULT - DIAGNOSIS, PT EVAL
intracranial space occupying lesion on diagnostic imaging/large meningioma; neuro event --> s/p craniotomy

## 2018-12-06 NOTE — PHYSICAL THERAPY INITIAL EVALUATION ADULT - PREDICTED DURATION OF THERAPY (DAYS/WKS), PT EVAL
Pt. will not be placed on PT program at this time secondary to independently amb. 200' without assistive device & safely amb. up & down 12 stairs.

## 2018-12-06 NOTE — PHYSICAL THERAPY INITIAL EVALUATION ADULT - PERTINENT HX OF CURRENT PROBLEM, REHAB EVAL
Pt. is a 47 y/o female admitted to [facility] on 18 with a dx of intracranial space occupying lesion on diagnostic imaging/large meningioma.  PT consult request secondary to neuro event --> s/p craniotomy.  H/O .

## 2018-12-06 NOTE — PROVIDER CONTACT NOTE (OTHER) - ACTION/TREATMENT ORDERED:
PAs aware. encourage patient to keep HOB elevated and to ambulate
ENEIDA Garcia came and assess patient. No medical intervention at this time.
Maalox ordered as needed. Will continue to monitor.

## 2018-12-06 NOTE — PROGRESS NOTE ADULT - PROBLEM SELECTOR PLAN 1
1. Neurochecks q4H  2. Repeat Head CT in AM  3. Postop MRI today  4. continue decadron  6. continue kera  Case d/w Dr. Etienne 1. Neurochecks q4H  2. Postop MRI today  3. continue decadron  [facility]  [facility] d/w Dr. Etienne

## 2018-12-06 NOTE — PROGRESS NOTE ADULT - SUBJECTIVE AND OBJECTIVE BOX
OVERNIGHT EVENTS:  [facility]'ed yesterday. Pt had some anxiety overnight and strange sensation around her eyes. Vison continues to improve, pt reassured    ICU Vital Signs Last 24 Hrs  T(C): 37 (04 Dec 2018 22:00), Max: 37 (04 Dec 2018 22:00)  T(F): 98.6 (04 Dec 2018 22:00), Max: 98.6 (04 Dec 2018 22:00)  HR: 83 (05 Dec 2018 01:00) (71 - 95)  BP: 109/46 (05 Dec 2018 01:00) (96/50 - 138/68)  BP(mean): 58 (05 Dec 2018 00:00) (56 - 91)  ABP: 105/91 (04 Dec 2018 08:30) (105/91 - 105/91)  ABP(mean): 96 (04 Dec 2018 08:30) (96 - 96)  RR: 16 (05 Dec 2018 01:00) (12 - 24)  SpO2: 99% (05 Dec 2018 01:00) (96% - 100%)    AAO X 3  PERRLA, EOMI  CN 2-12 grossly intact  DELCID strength 5/5  No drift      MEDICATIONS  (STANDING):  clindamycin IVPB 900 milliGRAM(s) IV Intermittent every 8 hours  dexamethasone     Tablet 6 milliGRAM(s) Oral every 6 hours  escitalopram 10 milliGRAM(s) Oral at bedtime  influenza   Vaccine 0.5 milliLiter(s) IntraMuscular once  insulin lispro (HumaLOG) corrective regimen sliding scale   SubCutaneous three times a day before meals  levETIRAcetam 500 milliGRAM(s) Oral two times a day  pantoprazole    Tablet 40 milliGRAM(s) Oral before breakfast    MEDICATIONS  (PRN):  acetaminophen   Tablet .. 650 milliGRAM(s) Oral every 6 hours PRN Mild Pain (1 - 3)  oxyCODONE    IR 5 milliGRAM(s) Oral every 4 hours PRN Moderate Pain (4 - 6)  oxyCODONE    IR 10 milliGRAM(s) Oral every 6 hours PRN Severe Pain (7 - 10)                          8.2    18.99 )-----------( 130      ( 04 Dec 2018 05:30 )             23.9   12-04    137  |  102  |  10  ----------------------------<  110<H>  4.1   |  25  |  0.56    Ca    8.4      04 Dec 2018 05:30  Phos  3.9     12-04  Mg     1.6     12-04    < from: CT Head No Cont (12.04.18 @ 06:34) >  This exam is compared with prior contrast-enhanced head CT performed on   December 2, 2018.    Low-lying cerebellar tonsils are seen. The tonsils extend approximately   7.2 mm below the foramen. This could be secondary to Chiari I   malformation.    Postop changes compatible with a right frontal temporal craniotomy is   identified. Previously noted large enhancing extra axial lesion has been   removed. There is evidence of high attenuation identified in the postop   region which is compatible areas of hemorrhage and or postop material.   Surrounding edema is identified. There is extra-axial air and fluid   identified in the postop region. This finding measures approximately 1.6   cm widest diameter. Decrease mass effect is seen on the right lateral   ventricle though residual mass effect is still identified. Right to left   shift (7.3 mm) seen.    There is evidence of an extra-axial drainage catheter identified.    Evaluation of the osseous structures with the appropriate window aside   from postop changes appear unremarkable    The visualized paranasal sinuses mastoid and middle ear regions appear   clear    Extracalvarial soft tissue swelling staples are seen in the postop region.    Impression: New postop changes as described above.    Low-lying cerebellar tonsils.    < end of copied text >

## 2018-12-07 ENCOUNTER — TRANSCRIPTION ENCOUNTER (OUTPATIENT)
Age: 48
End: 2018-12-07

## 2018-12-07 VITALS
SYSTOLIC BLOOD PRESSURE: 97 MMHG | OXYGEN SATURATION: 100 % | TEMPERATURE: 99 F | DIASTOLIC BLOOD PRESSURE: 50 MMHG | HEART RATE: 94 BPM | RESPIRATION RATE: 17 BRPM

## 2018-12-07 LAB — SURGICAL PATHOLOGY STUDY: SIGNIFICANT CHANGE UP

## 2018-12-07 RX ORDER — DEXTROSE 50 % IN WATER 50 %
12.5 SYRINGE (ML) INTRAVENOUS ONCE
Qty: 0 | Refills: 0 | Status: DISCONTINUED | OUTPATIENT
Start: 2018-12-07 | End: 2018-12-07

## 2018-12-07 RX ORDER — DEXTROSE 50 % IN WATER 50 %
25 SYRINGE (ML) INTRAVENOUS ONCE
Qty: 0 | Refills: 0 | Status: DISCONTINUED | OUTPATIENT
Start: 2018-12-07 | End: 2018-12-07

## 2018-12-07 RX ORDER — INSULIN LISPRO 100/ML
VIAL (ML) SUBCUTANEOUS AT BEDTIME
Qty: 0 | Refills: 0 | Status: DISCONTINUED | OUTPATIENT
Start: 2018-12-07 | End: 2018-12-07

## 2018-12-07 RX ORDER — OXYCODONE HYDROCHLORIDE 5 MG/1
1 TABLET ORAL
Qty: 30 | Refills: 0 | OUTPATIENT
Start: 2018-12-07 | End: 2018-12-11

## 2018-12-07 RX ORDER — ALPRAZOLAM 0.25 MG
1 TABLET ORAL
Qty: 7 | Refills: 0 | OUTPATIENT
Start: 2018-12-07 | End: 2018-12-13

## 2018-12-07 RX ORDER — SODIUM CHLORIDE 9 MG/ML
1000 INJECTION, SOLUTION INTRAVENOUS
Qty: 0 | Refills: 0 | Status: DISCONTINUED | OUTPATIENT
Start: 2018-12-07 | End: 2018-12-07

## 2018-12-07 RX ORDER — GLUCAGON INJECTION, SOLUTION 0.5 MG/.1ML
1 INJECTION, SOLUTION SUBCUTANEOUS ONCE
Qty: 0 | Refills: 0 | Status: DISCONTINUED | OUTPATIENT
Start: 2018-12-07 | End: 2018-12-07

## 2018-12-07 RX ORDER — DEXTROSE 50 % IN WATER 50 %
15 SYRINGE (ML) INTRAVENOUS ONCE
Qty: 0 | Refills: 0 | Status: DISCONTINUED | OUTPATIENT
Start: 2018-12-07 | End: 2018-12-07

## 2018-12-07 RX ORDER — PANTOPRAZOLE SODIUM 20 MG/1
1 TABLET, DELAYED RELEASE ORAL
Qty: 30 | Refills: 0 | OUTPATIENT
Start: 2018-12-07 | End: 2019-01-05

## 2018-12-07 RX ORDER — ACETAMINOPHEN 500 MG
2 TABLET ORAL
Qty: 0 | Refills: 0 | COMMUNITY
Start: 2018-12-07

## 2018-12-07 RX ORDER — MAGNESIUM HYDROXIDE 400 MG/1
30 TABLET, CHEWABLE ORAL
Qty: 0 | Refills: 0 | COMMUNITY
Start: 2018-12-07

## 2018-12-07 RX ORDER — DEXAMETHASONE 0.5 MG/5ML
2 ELIXIR ORAL
Qty: 13 | Refills: 0 | OUTPATIENT
Start: 2018-12-07

## 2018-12-07 RX ORDER — LEVETIRACETAM 250 MG/1
1 TABLET, FILM COATED ORAL
Qty: 14 | Refills: 0 | OUTPATIENT
Start: 2018-12-07 | End: 2018-12-13

## 2018-12-07 RX ORDER — INSULIN LISPRO 100/ML
VIAL (ML) SUBCUTANEOUS
Qty: 0 | Refills: 0 | Status: DISCONTINUED | OUTPATIENT
Start: 2018-12-07 | End: 2018-12-07

## 2018-12-07 RX ADMIN — Medication 2 MILLIGRAM(S): at 06:32

## 2018-12-07 RX ADMIN — OXYCODONE HYDROCHLORIDE 10 MILLIGRAM(S): 5 TABLET ORAL at 04:29

## 2018-12-07 RX ADMIN — LEVETIRACETAM 500 MILLIGRAM(S): 250 TABLET, FILM COATED ORAL at 06:31

## 2018-12-07 RX ADMIN — INFLUENZA VIRUS VACCINE 0.5 MILLILITER(S): 15; 15; 15; 15 SUSPENSION INTRAMUSCULAR at 12:49

## 2018-12-07 RX ADMIN — PANTOPRAZOLE SODIUM 40 MILLIGRAM(S): 20 TABLET, DELAYED RELEASE ORAL at 08:43

## 2018-12-07 RX ADMIN — OXYCODONE HYDROCHLORIDE 10 MILLIGRAM(S): 5 TABLET ORAL at 05:29

## 2018-12-07 RX ADMIN — Medication 0.5 MILLIGRAM(S): at 08:43

## 2018-12-07 NOTE — DISCHARGE NOTE ADULT - PATIENT PORTAL LINK FT
You can access the [facility] Patient Portal, offered by [facility], by registering with the following website: [email or web address]

## 2018-12-07 NOTE — DISCHARGE NOTE ADULT - CARE PLAN
Principal Discharge DX:	Intracranial mass  Goal:	To recover from surgery. Official pathology pending at time of discharge  Assessment and plan of treatment:	1. Remove top surgical dressing on post operative day 3 unless it was removed by the surgical team prior to your discharge. Incision should be left uncovered after day 3.   2. Begin showering with shampoo on post operative day 4. Avoid long soaks and do not submerge incision in bathtub. Regular shower only and allow soap and water to run over the incision. Pat incision area dry with clean towel- do not scrub. Please shower regularly to ensure incision stays clean to avoid post operative infections.   3. Notify your surgeon if you notice increased redness, drainage or you notice incision area opening.   4. Return to ER immediately for high fevers, severe headache, vomiting, lethargy or  weakness  5. Please call your neurosurgeon following discharge to make follow up appointment in 1 week after discharge unless otherwise specified. See Contact information below.   6. Prescription Post operative medication, if applicable,  are sent to [facility](unless another pharmacy specified)- [facility] is located in [facility] [employer]. All post operative prescrptions should be picked up before departing the hospital  7. Ambulate as tolerate. Continue with all "activities of daily living". Avoid strenuous activity or lifting more than 10 pounds until cleared for additional activity at your follow up appointment  8. Do not return to work or school until cleared by your neurosurgeon at your follow up visit unless specified to you during your hospital stay

## 2018-12-07 NOTE — DISCHARGE NOTE ADULT - PLAN OF CARE
To recover from surgery. Official pathology pending at time of discharge 1. Remove top surgical dressing on post operative day 3 unless it was removed by the surgical team prior to your discharge. Incision should be left uncovered after day 3.   2. Begin showering with shampoo on post operative day 4. Avoid long soaks and do not submerge incision in bathtub. Regular shower only and allow soap and water to run over the incision. Pat incision area dry with clean towel- do not scrub. Please shower regularly to ensure incision stays clean to avoid post operative infections.   3. Notify your surgeon if you notice increased redness, drainage or you notice incision area opening.   4. Return to ER immediately for high fevers, severe headache, vomiting, lethargy or  weakness  5. Please call your neurosurgeon following discharge to make follow up appointment in 1 week after discharge unless otherwise specified. See Contact information below.   6. Prescription Post operative medication, if applicable,  are sent to [email or web address](unless another pharmacy specified)- [email or web address] is located in [facility] [employer]. All post operative prescrptions should be picked up before departing the hospital  7. Ambulate as tolerate. Continue with all "activities of daily living". Avoid strenuous activity or lifting more than 10 pounds until cleared for additional activity at your follow up appointment  8. Do not return to work or school until cleared by your neurosurgeon at your follow up visit unless specified to you during your hospital stay

## 2018-12-07 NOTE — DISCHARGE NOTE ADULT - NS AS ACTIVITY OBS
No Heavy lifting/straining/Showering allowed/Walking-Indoors allowed/Walking-Outdoors allowed/Stairs allowed/Sex allowed

## 2018-12-07 NOTE — PROGRESS NOTE ADULT - REASON FOR ADMISSION
Large meningioma on outside MRI
Q1 neuro checks
Q1 neuro checks
Large meningioma on outside MRI

## 2018-12-07 NOTE — DISCHARGE NOTE ADULT - CARE PROVIDER_API CALL
Lul Etienne), Neurological Surgery; Pediatric Neurological Surgery  [address] [id number]  Phone: [phone]  Fax: [phone]

## 2018-12-07 NOTE — DISCHARGE NOTE ADULT - HOSPITAL COURSE
47 yo F denies PMH sent to ER by Neurologist Dr. Yvrose Dais who obtained MRI brain and orbits after patient was evaluated by Opthalmology and Neuroopthalmology and noted to have papilledema. Patient has been suffering from mild headaches since September but attributed this to stress after recently losing her son from complications for bone marrow transplant from Leukemia. Visual changes began at the same time. Patient states her vision was blurry and intermittently seeing diplopia. Denies numbness, tingling, weakness, seizure like activity, Chest pain, SOB. Patient has an MRI brain in 2009 which was reported as normal.   PMD Dr. Bri Valadez    Patient admitted to neurosurgery after MRI showed LARGE right sided meningioma. Patient was taken for surgery for resection 12/3/18 without issues. Patient remained PACU for 2 day. POOL drain removed and patietn sent to regular floor. Post op MRI obtained that showed gross total resection.   Decadron taper initiated. Patient OOB and ambulating well without any defecits. Stale for discahrge home on 12/7

## 2018-12-07 NOTE — PROGRESS NOTE ADULT - SUBJECTIVE AND OBJECTIVE BOX
No issues overnight  Vital Signs Last 24 Hrs  T(C): 36.7 (06 Dec 2018 22:29), Max: 37.1 (06 Dec 2018 10:44)  T(F): 98.1 (06 Dec 2018 22:29), Max: 98.7 (06 Dec 2018 10:44)  HR: 76 (06 Dec 2018 22:29) (73 - 100)  BP: 109/57 (06 Dec 2018 22:29) (105/56 - 116/59)  BP(mean): --  RR: 17 (06 Dec 2018 22:29) (17 - 18)  SpO2: 100% (06 Dec 2018 22:29) (99% - 100%)    AAO X 3  PERRLA, EOMI  CN 2-12 grossly intact  DELCID strength 5/5  No drift    MEDICATIONS  (STANDING):  dexamethasone     Tablet 2 milliGRAM(s) Oral every 8 hours  dextrose 5%. 1000 milliLiter(s) (50 mL/Hr) IV Continuous <Continuous>  dextrose 50% Injectable 12.5 Gram(s) IV Push once  dextrose 50% Injectable 25 Gram(s) IV Push once  dextrose 50% Injectable 25 Gram(s) IV Push once  escitalopram 10 milliGRAM(s) Oral at bedtime  influenza   Vaccine 0.5 milliLiter(s) IntraMuscular once  insulin lispro (HumaLOG) corrective regimen sliding scale   SubCutaneous three times a day before meals  insulin lispro (HumaLOG) corrective regimen sliding scale   SubCutaneous at bedtime  levETIRAcetam 500 milliGRAM(s) Oral two times a day  pantoprazole    Tablet 40 milliGRAM(s) Oral before breakfast    MEDICATIONS  (PRN):  acetaminophen   Tablet .. 650 milliGRAM(s) Oral every 6 hours PRN Mild Pain (1 - 3)  ALPRAZolam 0.5 milliGRAM(s) Oral daily PRN for anxiety  dextrose 40% Gel 15 Gram(s) Oral once PRN Blood Glucose LESS THAN 70 milliGRAM(s)/deciliter  glucagon  Injectable 1 milliGRAM(s) IntraMuscular once PRN Glucose LESS THAN 70 milligrams/deciliter  magnesium hydroxide Suspension 30 milliLiter(s) Oral daily PRN Constipation  oxyCODONE    IR 5 milliGRAM(s) Oral every 4 hours PRN Moderate Pain (4 - 6)  oxyCODONE    IR 10 milliGRAM(s) Oral every 4 hours PRN Severe Pain (7 - 10)

## 2018-12-07 NOTE — DISCHARGE NOTE ADULT - MEDICATION SUMMARY - MEDICATIONS TO TAKE
I will START or STAY ON the medications listed below when I get home from the hospital:    dexamethasone 1 mg oral tablet  -- 2 tab(s) by mouth every 8 hours x 1 day, 2 tabs by mouth every 12 hours x 1 day, 2 tabs daily x 1 day then 1 tab daily x 1 day then STOP  -- It is very important that you take or use this exactly as directed.  Do not skip doses or discontinue unless directed by your doctor.  Obtain medical advice before taking any non-prescription drugs as some may affect the action of this medication.  Take with food or milk.    -- Indication: For Post operative steroid taper    oxyCODONE 5 mg oral tablet  -- 1-2 tab(s) by mouth every 4 hours, As needed, Moderate Pain (4 - 6) MDD:12 tabs  -- Indication: For take as needed for moderate pain    acetaminophen 325 mg oral tablet  -- 2 tab(s) by mouth every 6 hours, As needed, Mild Pain (1 - 3)  -- Indication: For Take for mild pain    magnesium hydroxide 8% oral suspension  -- 30 milliliter(s) by mouth once a day, As needed, Constipation  -- Indication: For obtain over the counter and take regularly if the pain medicaition makes you constipated    levETIRAcetam 500 mg oral tablet  -- 1 tab(s) by mouth 2 times a day  -- Indication: For Seizure prophylaxix for 7 days only    citalopram 10 mg oral tablet  -- 1 tab(s) by mouth once a day  -- Indication: For home med I will START or STAY ON the medications listed below when I get home from the hospital:    dexamethasone 1 mg oral tablet  -- 2 tab(s) by mouth every 8 hours x 1 day, 2 tabs by mouth every 12 hours x 1 day, 2 tabs daily x 1 day then 1 tab daily x 1 day then STOP  -- It is very important that you take or use this exactly as directed.  Do not skip doses or discontinue unless directed by your doctor.  Obtain medical advice before taking any non-prescription drugs as some may affect the action of this medication.  Take with food or milk.    -- Indication: For Post operative steroid taper    oxyCODONE 5 mg oral tablet  -- 1-2 tab(s) by mouth every 4 hours, As needed, Moderate Pain (4 - 6) MDD:12 tabs  -- Indication: For take as needed for moderate pain    acetaminophen 325 mg oral tablet  -- 2 tab(s) by mouth every 6 hours, As needed, Mild Pain (1 - 3)  -- Indication: For Take for mild pain    magnesium hydroxide 8% oral suspension  -- 30 milliliter(s) by mouth once a day, As needed, Constipation  -- Indication: For obtain over the counter and take regularly if the pain medicaition makes you constipated    levETIRAcetam 500 mg oral tablet  -- 1 tab(s) by mouth 2 times a day  -- Indication: For Seizure prophylaxix for 7 days only    citalopram 10 mg oral tablet  -- 1 tab(s) by mouth once a day  -- Indication: For home med    ALPRAZolam 0.5 mg oral tablet  -- 1 tab(s) by mouth once a day, As needed, for anxiety MDD:1 tab  -- Indication: For Take as needed for anxiety    pantoprazole 40 mg oral delayed release tablet  -- 1 tab(s) by mouth once a day (before a meal)  -- Indication: For GI ppx while on steroids

## 2018-12-10 ENCOUNTER — APPOINTMENT (OUTPATIENT)
Dept: INTERNAL MEDICINE | Facility: CLINIC | Age: 48
End: 2018-12-10
Payer: COMMERCIAL

## 2018-12-10 VITALS
WEIGHT: 123 LBS | TEMPERATURE: 98 F | RESPIRATION RATE: 16 BRPM | HEIGHT: 65 IN | DIASTOLIC BLOOD PRESSURE: 78 MMHG | BODY MASS INDEX: 20.49 KG/M2 | OXYGEN SATURATION: 99 % | SYSTOLIC BLOOD PRESSURE: 110 MMHG | HEART RATE: 124 BPM

## 2018-12-10 DIAGNOSIS — H91.90 UNSPECIFIED HEARING LOSS, UNSPECIFIED EAR: ICD-10-CM

## 2018-12-10 DIAGNOSIS — D32.9 BENIGN NEOPLASM OF MENINGES, UNSPECIFIED: ICD-10-CM

## 2018-12-10 DIAGNOSIS — M75.02 ADHESIVE CAPSULITIS OF LEFT SHOULDER: ICD-10-CM

## 2018-12-10 PROCEDURE — 99213 OFFICE O/P EST LOW 20 MIN: CPT

## 2018-12-11 PROBLEM — H91.90 CHANGE IN HEARING: Status: RESOLVED | Noted: 2017-11-18 | Resolved: 2018-12-11

## 2018-12-11 PROBLEM — M75.02 ADHESIVE CAPSULITIS OF LEFT SHOULDER: Status: RESOLVED | Noted: 2018-06-26 | Resolved: 2018-12-11

## 2018-12-11 PROBLEM — D32.9 MENINGIOMA: Status: ACTIVE | Noted: 2018-12-11

## 2019-02-28 ENCOUNTER — APPOINTMENT (OUTPATIENT)
Dept: MRI IMAGING | Facility: CLINIC | Age: 49
End: 2019-02-28
Payer: COMMERCIAL

## 2019-02-28 ENCOUNTER — OUTPATIENT (OUTPATIENT)
Dept: OUTPATIENT SERVICES | Facility: HOSPITAL | Age: 49
LOS: 1 days | End: 2019-02-28
Payer: COMMERCIAL

## 2019-02-28 DIAGNOSIS — D49.6 NEOPLASM OF UNSPECIFIED BEHAVIOR OF BRAIN: ICD-10-CM

## 2019-02-28 PROCEDURE — 70553 MRI BRAIN STEM W/O & W/DYE: CPT

## 2019-02-28 PROCEDURE — A9585: CPT

## 2019-02-28 PROCEDURE — 70553 MRI BRAIN STEM W/O & W/DYE: CPT | Mod: 26

## 2019-04-16 ENCOUNTER — RX RENEWAL (OUTPATIENT)
Age: 49
End: 2019-04-16

## 2019-05-14 ENCOUNTER — RX RENEWAL (OUTPATIENT)
Age: 49
End: 2019-05-14

## 2019-07-03 ENCOUNTER — RX RENEWAL (OUTPATIENT)
Age: 49
End: 2019-07-03

## 2019-08-24 ENCOUNTER — OUTPATIENT (OUTPATIENT)
Dept: OUTPATIENT SERVICES | Facility: HOSPITAL | Age: 49
LOS: 1 days | End: 2019-08-24
Payer: COMMERCIAL

## 2019-08-24 ENCOUNTER — APPOINTMENT (OUTPATIENT)
Dept: MRI IMAGING | Facility: CLINIC | Age: 49
End: 2019-08-24
Payer: COMMERCIAL

## 2019-08-24 DIAGNOSIS — Z00.8 ENCOUNTER FOR OTHER GENERAL EXAMINATION: ICD-10-CM

## 2019-08-24 PROCEDURE — 70553 MRI BRAIN STEM W/O & W/DYE: CPT

## 2019-08-24 PROCEDURE — 70553 MRI BRAIN STEM W/O & W/DYE: CPT | Mod: 26

## 2020-01-24 ENCOUNTER — TRANSCRIPTION ENCOUNTER (OUTPATIENT)
Age: 50
End: 2020-01-24

## 2020-01-24 ENCOUNTER — APPOINTMENT (OUTPATIENT)
Dept: INTERNAL MEDICINE | Facility: CLINIC | Age: 50
End: 2020-01-24
Payer: COMMERCIAL

## 2020-01-24 VITALS
DIASTOLIC BLOOD PRESSURE: 72 MMHG | HEART RATE: 89 BPM | HEIGHT: 65 IN | TEMPERATURE: 98 F | BODY MASS INDEX: 21.99 KG/M2 | WEIGHT: 132 LBS | RESPIRATION RATE: 17 BRPM | OXYGEN SATURATION: 99 % | SYSTOLIC BLOOD PRESSURE: 100 MMHG

## 2020-01-24 DIAGNOSIS — B00.1 HERPESVIRAL VESICULAR DERMATITIS: ICD-10-CM

## 2020-01-24 PROCEDURE — 99396 PREV VISIT EST AGE 40-64: CPT

## 2020-01-24 RX ORDER — ESCITALOPRAM OXALATE 5 MG/1
5 TABLET ORAL DAILY
Qty: 30 | Refills: 5 | Status: DISCONTINUED | COMMUNITY
Start: 2018-06-14 | End: 2020-01-24

## 2020-01-24 RX ORDER — ESCITALOPRAM OXALATE 10 MG/1
10 TABLET ORAL DAILY
Qty: 30 | Refills: 5 | Status: DISCONTINUED | COMMUNITY
Start: 2018-09-19 | End: 2020-01-24

## 2020-01-24 RX ORDER — ALBUTEROL SULFATE 90 UG/1
108 (90 BASE) AEROSOL, METERED RESPIRATORY (INHALATION)
Qty: 1 | Refills: 5 | Status: DISCONTINUED | COMMUNITY
Start: 2017-11-24 | End: 2020-01-24

## 2020-01-30 LAB
25(OH)D3 SERPL-MCNC: 13.2 NG/ML
ALBUMIN SERPL ELPH-MCNC: 5 G/DL
ALP BLD-CCNC: 71 U/L
ALT SERPL-CCNC: 20 U/L
ANION GAP SERPL CALC-SCNC: 13 MMOL/L
AST SERPL-CCNC: 18 U/L
BASOPHILS # BLD AUTO: 0.04 K/UL
BASOPHILS NFR BLD AUTO: 0.6 %
BILIRUB SERPL-MCNC: 0.7 MG/DL
BUN SERPL-MCNC: 13 MG/DL
CALCIUM SERPL-MCNC: 10 MG/DL
CHLORIDE SERPL-SCNC: 98 MMOL/L
CHOLEST SERPL-MCNC: 250 MG/DL
CHOLEST/HDLC SERPL: 3.1 RATIO
CO2 SERPL-SCNC: 24 MMOL/L
CREAT SERPL-MCNC: 0.72 MG/DL
EOSINOPHIL # BLD AUTO: 0.09 K/UL
EOSINOPHIL NFR BLD AUTO: 1.3 %
GLUCOSE SERPL-MCNC: 81 MG/DL
HCT VFR BLD CALC: 41.6 %
HDLC SERPL-MCNC: 80 MG/DL
HGB BLD-MCNC: 12.1 G/DL
IMM GRANULOCYTES NFR BLD AUTO: 0.1 %
LDLC SERPL CALC-MCNC: 158 MG/DL
LYMPHOCYTES # BLD AUTO: 1.95 K/UL
LYMPHOCYTES NFR BLD AUTO: 29.2 %
MAN DIFF?: NORMAL
MCHC RBC-ENTMCNC: 22.7 PG
MCHC RBC-ENTMCNC: 29.1 GM/DL
MCV RBC AUTO: 78 FL
MONOCYTES # BLD AUTO: 0.5 K/UL
MONOCYTES NFR BLD AUTO: 7.5 %
NEUTROPHILS # BLD AUTO: 4.08 K/UL
NEUTROPHILS NFR BLD AUTO: 61.3 %
PLATELET # BLD AUTO: 306 K/UL
POTASSIUM SERPL-SCNC: 4.8 MMOL/L
PROT SERPL-MCNC: 8 G/DL
RBC # BLD: 5.33 M/UL
RBC # FLD: 19.3 %
SODIUM SERPL-SCNC: 135 MMOL/L
TRIGL SERPL-MCNC: 63 MG/DL
TSH SERPL-ACNC: 0.47 UIU/ML
VIT B12 SERPL-MCNC: 536 PG/ML
WBC # FLD AUTO: 6.67 K/UL

## 2021-03-24 ENCOUNTER — LABORATORY RESULT (OUTPATIENT)
Age: 51
End: 2021-03-24

## 2021-03-24 ENCOUNTER — APPOINTMENT (OUTPATIENT)
Dept: INTERNAL MEDICINE | Facility: CLINIC | Age: 51
End: 2021-03-24
Payer: COMMERCIAL

## 2021-03-24 ENCOUNTER — NON-APPOINTMENT (OUTPATIENT)
Age: 51
End: 2021-03-24

## 2021-03-24 VITALS
BODY MASS INDEX: 20.33 KG/M2 | HEART RATE: 75 BPM | WEIGHT: 122 LBS | DIASTOLIC BLOOD PRESSURE: 69 MMHG | OXYGEN SATURATION: 100 % | RESPIRATION RATE: 16 BRPM | SYSTOLIC BLOOD PRESSURE: 103 MMHG | HEIGHT: 65 IN

## 2021-03-24 PROCEDURE — 99396 PREV VISIT EST AGE 40-64: CPT

## 2021-03-24 PROCEDURE — 99072 ADDL SUPL MATRL&STAF TM PHE: CPT

## 2021-03-24 RX ORDER — ERGOCALCIFEROL 1.25 MG/1
1.25 MG CAPSULE, LIQUID FILLED ORAL
Qty: 8 | Refills: 5 | Status: DISCONTINUED | COMMUNITY
Start: 2020-01-30 | End: 2021-03-24

## 2021-03-29 DIAGNOSIS — Z86.2 PERSONAL HISTORY OF DISEASES OF THE BLOOD AND BLOOD-FORMING ORGANS AND CERTAIN DISORDERS INVOLVING THE IMMUNE MECHANISM: ICD-10-CM

## 2021-03-29 LAB
25(OH)D3 SERPL-MCNC: 17.7 NG/ML
ALBUMIN SERPL ELPH-MCNC: 4.7 G/DL
ALP BLD-CCNC: 52 U/L
ALT SERPL-CCNC: 15 U/L
ANION GAP SERPL CALC-SCNC: 11 MMOL/L
APPEARANCE: ABNORMAL
AST SERPL-CCNC: 15 U/L
BASOPHILS # BLD AUTO: 0.05 K/UL
BASOPHILS NFR BLD AUTO: 0.8 %
BILIRUB SERPL-MCNC: 0.5 MG/DL
BILIRUBIN URINE: NEGATIVE
BLOOD URINE: NEGATIVE
BUN SERPL-MCNC: 12 MG/DL
CALCIUM SERPL-MCNC: 9.6 MG/DL
CHLORIDE SERPL-SCNC: 101 MMOL/L
CHOLEST SERPL-MCNC: 187 MG/DL
CO2 SERPL-SCNC: 25 MMOL/L
COLOR: NORMAL
COVID-19 SPIKE DOMAIN ANTIBODY INTERPRETATION: POSITIVE
CREAT SERPL-MCNC: 0.67 MG/DL
EOSINOPHIL # BLD AUTO: 0.08 K/UL
EOSINOPHIL NFR BLD AUTO: 1.3 %
GLUCOSE QUALITATIVE U: NEGATIVE
GLUCOSE SERPL-MCNC: 82 MG/DL
HCT VFR BLD CALC: 40.4 %
HDLC SERPL-MCNC: 69 MG/DL
HGB BLD-MCNC: 12.2 G/DL
IMM GRANULOCYTES NFR BLD AUTO: 0.2 %
KETONES URINE: NEGATIVE
LDLC SERPL CALC-MCNC: 105 MG/DL
LEUKOCYTE ESTERASE URINE: NEGATIVE
LYMPHOCYTES # BLD AUTO: 1.78 K/UL
LYMPHOCYTES NFR BLD AUTO: 28.6 %
MAN DIFF?: NORMAL
MCHC RBC-ENTMCNC: 24.1 PG
MCHC RBC-ENTMCNC: 30.2 GM/DL
MCV RBC AUTO: 79.7 FL
MONOCYTES # BLD AUTO: 0.51 K/UL
MONOCYTES NFR BLD AUTO: 8.2 %
NEUTROPHILS # BLD AUTO: 3.79 K/UL
NEUTROPHILS NFR BLD AUTO: 60.9 %
NITRITE URINE: NEGATIVE
NONHDLC SERPL-MCNC: 117 MG/DL
PH URINE: 6
PLATELET # BLD AUTO: 288 K/UL
POTASSIUM SERPL-SCNC: 4.5 MMOL/L
PROT SERPL-MCNC: 7.6 G/DL
PROTEIN URINE: NEGATIVE
RBC # BLD: 5.07 M/UL
RBC # FLD: 17.2 %
SARS-COV-2 AB SERPL IA-ACNC: >250 U/ML
SODIUM SERPL-SCNC: 137 MMOL/L
SPECIFIC GRAVITY URINE: 1.01
TRIGL SERPL-MCNC: 63 MG/DL
TSH SERPL-ACNC: 0.31 UIU/ML
UROBILINOGEN URINE: NORMAL
WBC # FLD AUTO: 6.22 K/UL

## 2022-02-01 ENCOUNTER — APPOINTMENT (OUTPATIENT)
Dept: MRI IMAGING | Facility: CLINIC | Age: 52
End: 2022-02-01
Payer: COMMERCIAL

## 2022-02-01 ENCOUNTER — OUTPATIENT (OUTPATIENT)
Dept: OUTPATIENT SERVICES | Facility: HOSPITAL | Age: 52
LOS: 1 days | End: 2022-02-01
Payer: COMMERCIAL

## 2022-02-01 DIAGNOSIS — D49.6 NEOPLASM OF UNSPECIFIED BEHAVIOR OF BRAIN: ICD-10-CM

## 2022-02-01 PROCEDURE — 70553 MRI BRAIN STEM W/O & W/DYE: CPT

## 2022-02-01 PROCEDURE — A9585: CPT

## 2022-02-01 PROCEDURE — 70553 MRI BRAIN STEM W/O & W/DYE: CPT | Mod: 26

## 2022-11-08 ENCOUNTER — TRANSCRIPTION ENCOUNTER (OUTPATIENT)
Age: 52
End: 2022-11-08

## 2022-11-08 RX ORDER — ERGOCALCIFEROL 1.25 MG/1
1.25 MG CAPSULE ORAL
Qty: 12 | Refills: 1 | Status: ACTIVE | COMMUNITY
Start: 2021-03-29 | End: 1900-01-01

## 2023-02-17 ENCOUNTER — APPOINTMENT (OUTPATIENT)
Dept: INTERNAL MEDICINE | Facility: CLINIC | Age: 53
End: 2023-02-17
Payer: COMMERCIAL

## 2023-02-17 ENCOUNTER — NON-APPOINTMENT (OUTPATIENT)
Age: 53
End: 2023-02-17

## 2023-02-17 VITALS
HEIGHT: 65 IN | OXYGEN SATURATION: 98 % | TEMPERATURE: 97.8 F | SYSTOLIC BLOOD PRESSURE: 103 MMHG | DIASTOLIC BLOOD PRESSURE: 69 MMHG | BODY MASS INDEX: 20.49 KG/M2 | RESPIRATION RATE: 17 BRPM | HEART RATE: 78 BPM | WEIGHT: 123 LBS

## 2023-02-17 DIAGNOSIS — Z12.11 ENCOUNTER FOR SCREENING FOR MALIGNANT NEOPLASM OF COLON: ICD-10-CM

## 2023-02-17 DIAGNOSIS — R53.83 OTHER FATIGUE: ICD-10-CM

## 2023-02-17 PROCEDURE — 93000 ELECTROCARDIOGRAM COMPLETE: CPT

## 2023-02-17 PROCEDURE — 99396 PREV VISIT EST AGE 40-64: CPT | Mod: 25

## 2023-02-21 DIAGNOSIS — E04.1 NONTOXIC SINGLE THYROID NODULE: ICD-10-CM

## 2023-02-27 LAB
25(OH)D3 SERPL-MCNC: 50.5 NG/ML
ALBUMIN SERPL ELPH-MCNC: 4.9 G/DL
ALP BLD-CCNC: 49 U/L
ALT SERPL-CCNC: 19 U/L
ANION GAP SERPL CALC-SCNC: 13 MMOL/L
APPEARANCE: CLEAR
AST SERPL-CCNC: 15 U/L
BASOPHILS # BLD AUTO: 0.07 K/UL
BASOPHILS NFR BLD AUTO: 1.1 %
BILIRUB SERPL-MCNC: 0.4 MG/DL
BILIRUBIN URINE: NEGATIVE
BLOOD URINE: NEGATIVE
BUN SERPL-MCNC: 19 MG/DL
CALCIUM SERPL-MCNC: 9.9 MG/DL
CHLORIDE SERPL-SCNC: 100 MMOL/L
CHOLEST SERPL-MCNC: 246 MG/DL
CO2 SERPL-SCNC: 24 MMOL/L
COLOR: NORMAL
CREAT SERPL-MCNC: 0.64 MG/DL
EGFR: 107 ML/MIN/1.73M2
EOSINOPHIL # BLD AUTO: 0.1 K/UL
EOSINOPHIL NFR BLD AUTO: 1.6 %
ESTIMATED AVERAGE GLUCOSE: 108 MG/DL
FERRITIN SERPL-MCNC: 38 NG/ML
GLUCOSE QUALITATIVE U: NEGATIVE
GLUCOSE SERPL-MCNC: 85 MG/DL
HBA1C MFR BLD HPLC: 5.4 %
HCT VFR BLD CALC: 44 %
HDLC SERPL-MCNC: 71 MG/DL
HGB BLD-MCNC: 13.8 G/DL
IMM GRANULOCYTES NFR BLD AUTO: 0.3 %
IRON SATN MFR SERPL: 21 %
IRON SERPL-MCNC: 79 UG/DL
KETONES URINE: NEGATIVE
LDLC SERPL CALC-MCNC: 162 MG/DL
LEUKOCYTE ESTERASE URINE: NEGATIVE
LYMPHOCYTES # BLD AUTO: 1.65 K/UL
LYMPHOCYTES NFR BLD AUTO: 27.1 %
MAN DIFF?: NORMAL
MCHC RBC-ENTMCNC: 28.5 PG
MCHC RBC-ENTMCNC: 31.4 GM/DL
MCV RBC AUTO: 90.7 FL
MONOCYTES # BLD AUTO: 0.4 K/UL
MONOCYTES NFR BLD AUTO: 6.6 %
NEUTROPHILS # BLD AUTO: 3.85 K/UL
NEUTROPHILS NFR BLD AUTO: 63.3 %
NITRITE URINE: NEGATIVE
NONHDLC SERPL-MCNC: 175 MG/DL
PH URINE: 6
PLATELET # BLD AUTO: 279 K/UL
POTASSIUM SERPL-SCNC: 5.3 MMOL/L
PROT SERPL-MCNC: 8 G/DL
PROTEIN URINE: NEGATIVE
RBC # BLD: 4.85 M/UL
RBC # FLD: 14.4 %
SODIUM SERPL-SCNC: 137 MMOL/L
SPECIFIC GRAVITY URINE: 1.02
TIBC SERPL-MCNC: 373 UG/DL
TRIGL SERPL-MCNC: 67 MG/DL
TSH SERPL-ACNC: 0.22 UIU/ML
UIBC SERPL-MCNC: 293 UG/DL
UROBILINOGEN URINE: NORMAL
WBC # FLD AUTO: 6.09 K/UL

## 2023-03-07 ENCOUNTER — APPOINTMENT (OUTPATIENT)
Dept: CT IMAGING | Facility: CLINIC | Age: 53
End: 2023-03-07
Payer: COMMERCIAL

## 2023-03-07 LAB
T4 FREE SERPL-MCNC: 1.3 NG/DL
THYROGLOB AB SERPL-ACNC: <20 IU/ML
THYROPEROXIDASE AB SERPL IA-ACNC: <10 IU/ML
TSH SERPL-ACNC: 0.22 UIU/ML
TSI ACT/NOR SER: <0.1 IU/L

## 2023-03-07 PROCEDURE — 75571 CT HRT W/O DYE W/CA TEST: CPT

## 2023-06-15 DIAGNOSIS — R79.89 OTHER SPECIFIED ABNORMAL FINDINGS OF BLOOD CHEMISTRY: ICD-10-CM

## 2023-07-03 LAB
CHOLEST SERPL-MCNC: 203 MG/DL
HDLC SERPL-MCNC: 72 MG/DL
LDLC SERPL CALC-MCNC: 117 MG/DL
NONHDLC SERPL-MCNC: 131 MG/DL
T4 FREE SERPL-MCNC: 1.3 NG/DL
TRIGL SERPL-MCNC: 67 MG/DL
TSH SERPL-ACNC: 0.26 UIU/ML

## 2024-02-21 ENCOUNTER — NON-APPOINTMENT (OUTPATIENT)
Age: 54
End: 2024-02-21

## 2024-02-21 ENCOUNTER — APPOINTMENT (OUTPATIENT)
Dept: INTERNAL MEDICINE | Facility: CLINIC | Age: 54
End: 2024-02-21
Payer: COMMERCIAL

## 2024-02-21 VITALS
RESPIRATION RATE: 17 BRPM | TEMPERATURE: 98.3 F | DIASTOLIC BLOOD PRESSURE: 72 MMHG | HEIGHT: 65 IN | WEIGHT: 126 LBS | OXYGEN SATURATION: 100 % | HEART RATE: 77 BPM | BODY MASS INDEX: 20.99 KG/M2 | SYSTOLIC BLOOD PRESSURE: 110 MMHG

## 2024-02-21 DIAGNOSIS — R79.89 OTHER SPECIFIED ABNORMAL FINDINGS OF BLOOD CHEMISTRY: ICD-10-CM

## 2024-02-21 DIAGNOSIS — Z00.00 ENCOUNTER FOR GENERAL ADULT MEDICAL EXAMINATION W/OUT ABNORMAL FINDINGS: ICD-10-CM

## 2024-02-21 DIAGNOSIS — E78.5 HYPERLIPIDEMIA, UNSPECIFIED: ICD-10-CM

## 2024-02-21 PROCEDURE — 93000 ELECTROCARDIOGRAM COMPLETE: CPT

## 2024-02-21 PROCEDURE — 99396 PREV VISIT EST AGE 40-64: CPT | Mod: 25

## 2024-02-21 RX ORDER — VALACYCLOVIR 1 G/1
1 TABLET, FILM COATED ORAL
Qty: 30 | Refills: 5 | Status: ACTIVE | COMMUNITY
Start: 2020-01-24 | End: 1900-01-01

## 2024-02-23 LAB
25(OH)D3 SERPL-MCNC: 39.2 NG/ML
ALBUMIN SERPL ELPH-MCNC: 4.7 G/DL
ALP BLD-CCNC: 49 U/L
ALT SERPL-CCNC: 19 U/L
ANION GAP SERPL CALC-SCNC: 13 MMOL/L
AST SERPL-CCNC: 16 U/L
BILIRUB SERPL-MCNC: 0.5 MG/DL
BUN SERPL-MCNC: 14 MG/DL
CALCIUM SERPL-MCNC: 9.5 MG/DL
CHLORIDE SERPL-SCNC: 99 MMOL/L
CHOLEST SERPL-MCNC: 204 MG/DL
CO2 SERPL-SCNC: 25 MMOL/L
CREAT SERPL-MCNC: 0.69 MG/DL
EGFR: 104 ML/MIN/1.73M2
ESTIMATED AVERAGE GLUCOSE: 111 MG/DL
GLUCOSE SERPL-MCNC: 87 MG/DL
HBA1C MFR BLD HPLC: 5.5 %
HCT VFR BLD CALC: 40.8 %
HDLC SERPL-MCNC: 72 MG/DL
HGB BLD-MCNC: 13 G/DL
LDLC SERPL CALC-MCNC: 119 MG/DL
MCHC RBC-ENTMCNC: 27.4 PG
MCHC RBC-ENTMCNC: 31.9 GM/DL
MCV RBC AUTO: 85.9 FL
NONHDLC SERPL-MCNC: 132 MG/DL
PLATELET # BLD AUTO: 222 K/UL
POTASSIUM SERPL-SCNC: 4.3 MMOL/L
PROT SERPL-MCNC: 7.5 G/DL
RBC # BLD: 4.75 M/UL
RBC # FLD: 14.6 %
SODIUM SERPL-SCNC: 137 MMOL/L
T4 FREE SERPL-MCNC: 1.4 NG/DL
TRIGL SERPL-MCNC: 67 MG/DL
TSH SERPL-ACNC: 0.27 UIU/ML
WBC # FLD AUTO: 6.29 K/UL

## 2024-10-21 NOTE — ED ADULT NURSE NOTE - NSIMPLEMENTINTERV_GEN_ALL_ED
Implemented All Universal Safety Interventions:  [place] to call system. Call bell, personal items and telephone within reach. Instruct patient to call for assistance. Room bathroom lighting operational. Non-slip footwear when patient is off stretcher. Physically safe environment: no spills, clutter or unnecessary equipment. Stretcher in lowest position, wheels locked, appropriate side rails in place. Discharged